# Patient Record
Sex: MALE | Race: WHITE | Employment: UNEMPLOYED | URBAN - METROPOLITAN AREA
[De-identification: names, ages, dates, MRNs, and addresses within clinical notes are randomized per-mention and may not be internally consistent; named-entity substitution may affect disease eponyms.]

---

## 2018-06-07 ENCOUNTER — APPOINTMENT (OUTPATIENT)
Dept: GENERAL RADIOLOGY | Age: 53
End: 2018-06-07
Payer: COMMERCIAL

## 2018-06-07 ENCOUNTER — HOSPITAL ENCOUNTER (EMERGENCY)
Age: 53
Discharge: HOME | End: 2018-06-07
Payer: COMMERCIAL

## 2018-06-07 VITALS
RESPIRATION RATE: 14 BRPM | HEART RATE: 86 BPM | OXYGEN SATURATION: 98 % | HEIGHT: 68 IN | BODY MASS INDEX: 40.92 KG/M2 | SYSTOLIC BLOOD PRESSURE: 140 MMHG | TEMPERATURE: 98.2 F | DIASTOLIC BLOOD PRESSURE: 86 MMHG | WEIGHT: 270 LBS

## 2018-06-07 DIAGNOSIS — S80.811A: Primary | ICD-10-CM

## 2018-06-07 DIAGNOSIS — S80.11XA CONTUSION OF RIGHT TIBIA: ICD-10-CM

## 2018-06-07 PROCEDURE — 6370000000 HC RX 637 (ALT 250 FOR IP): Performed by: NURSE PRACTITIONER

## 2018-06-07 PROCEDURE — 73590 X-RAY EXAM OF LOWER LEG: CPT

## 2018-06-07 PROCEDURE — 99282 EMERGENCY DEPT VISIT SF MDM: CPT

## 2018-06-07 RX ORDER — ACETAMINOPHEN 500 MG
1000 TABLET ORAL ONCE
Status: COMPLETED | OUTPATIENT
Start: 2018-06-07 | End: 2018-06-07

## 2018-06-07 RX ORDER — ACETAMINOPHEN 500 MG
500 TABLET ORAL EVERY 6 HOURS PRN
Qty: 20 TABLET | Refills: 0 | Status: SHIPPED | OUTPATIENT
Start: 2018-06-07 | End: 2020-04-14

## 2018-06-07 RX ADMIN — ACETAMINOPHEN 1000 MG: 500 TABLET ORAL at 18:21

## 2018-06-07 ASSESSMENT — PAIN SCALES - GENERAL
PAINLEVEL_OUTOF10: 10

## 2018-06-07 ASSESSMENT — PAIN DESCRIPTION - ORIENTATION
ORIENTATION: RIGHT;LOWER
ORIENTATION: RIGHT

## 2018-06-07 ASSESSMENT — PAIN DESCRIPTION - PAIN TYPE
TYPE: ACUTE PAIN
TYPE: ACUTE PAIN

## 2018-06-07 ASSESSMENT — PAIN DESCRIPTION - LOCATION
LOCATION: LEG
LOCATION: LEG

## 2018-06-07 ASSESSMENT — PAIN DESCRIPTION - DESCRIPTORS
DESCRIPTORS: ACHING;BURNING
DESCRIPTORS: BURNING

## 2018-06-07 ASSESSMENT — PAIN DESCRIPTION - FREQUENCY
FREQUENCY: CONTINUOUS
FREQUENCY: CONTINUOUS

## 2018-06-07 ASSESSMENT — PAIN DESCRIPTION - PROGRESSION: CLINICAL_PROGRESSION: NOT CHANGED

## 2018-06-21 ENCOUNTER — HOSPITAL ENCOUNTER (EMERGENCY)
Age: 53
Discharge: HOME | End: 2018-06-21
Payer: COMMERCIAL

## 2018-06-21 ENCOUNTER — APPOINTMENT (OUTPATIENT)
Dept: GENERAL RADIOLOGY | Age: 53
End: 2018-06-21
Payer: COMMERCIAL

## 2018-06-21 VITALS
OXYGEN SATURATION: 97 % | RESPIRATION RATE: 20 BRPM | TEMPERATURE: 98.2 F | SYSTOLIC BLOOD PRESSURE: 147 MMHG | HEART RATE: 74 BPM | DIASTOLIC BLOOD PRESSURE: 88 MMHG

## 2018-06-21 DIAGNOSIS — S69.92XA WRIST INJURY, LEFT, INITIAL ENCOUNTER: Primary | ICD-10-CM

## 2018-06-21 PROCEDURE — L3931 WHFO NONTORSION JOINT PREFAB: HCPCS

## 2018-06-21 PROCEDURE — 73110 X-RAY EXAM OF WRIST: CPT

## 2018-06-21 PROCEDURE — 99212 OFFICE O/P EST SF 10 MIN: CPT

## 2018-06-21 ASSESSMENT — PAIN SCALES - GENERAL: PAINLEVEL_OUTOF10: 9

## 2018-06-21 ASSESSMENT — PAIN DESCRIPTION - PROGRESSION: CLINICAL_PROGRESSION: NOT CHANGED

## 2018-06-21 ASSESSMENT — PAIN DESCRIPTION - DESCRIPTORS: DESCRIPTORS: ACHING;SHARP

## 2018-06-21 ASSESSMENT — PAIN DESCRIPTION - FREQUENCY: FREQUENCY: INTERMITTENT

## 2018-06-21 ASSESSMENT — PAIN DESCRIPTION - LOCATION: LOCATION: WRIST

## 2018-06-21 ASSESSMENT — PAIN DESCRIPTION - PAIN TYPE: TYPE: ACUTE PAIN

## 2018-06-21 ASSESSMENT — PAIN DESCRIPTION - ORIENTATION: ORIENTATION: LEFT

## 2018-06-21 ASSESSMENT — PAIN DESCRIPTION - ONSET: ONSET: SUDDEN

## 2019-03-08 ENCOUNTER — HOSPITAL ENCOUNTER (EMERGENCY)
Age: 54
Discharge: HOME | End: 2019-03-08
Payer: COMMERCIAL

## 2019-03-08 ENCOUNTER — APPOINTMENT (OUTPATIENT)
Dept: GENERAL RADIOLOGY | Age: 54
End: 2019-03-08
Payer: COMMERCIAL

## 2019-03-08 VITALS
DIASTOLIC BLOOD PRESSURE: 84 MMHG | HEART RATE: 78 BPM | RESPIRATION RATE: 16 BRPM | HEIGHT: 66 IN | TEMPERATURE: 98.7 F | OXYGEN SATURATION: 99 % | SYSTOLIC BLOOD PRESSURE: 134 MMHG | BODY MASS INDEX: 41.78 KG/M2 | WEIGHT: 260 LBS

## 2019-03-08 DIAGNOSIS — J10.1 INFLUENZA A: Primary | ICD-10-CM

## 2019-03-08 LAB
INFLUENZA A BY PCR: DETECTED
INFLUENZA B BY PCR: NOT DETECTED

## 2019-03-08 PROCEDURE — 99283 EMERGENCY DEPT VISIT LOW MDM: CPT

## 2019-03-08 PROCEDURE — 6370000000 HC RX 637 (ALT 250 FOR IP): Performed by: NURSE PRACTITIONER

## 2019-03-08 PROCEDURE — 87502 INFLUENZA DNA AMP PROBE: CPT

## 2019-03-08 PROCEDURE — 71046 X-RAY EXAM CHEST 2 VIEWS: CPT

## 2019-03-08 RX ORDER — PREDNISONE 10 MG/1
TABLET ORAL
Qty: 30 TABLET | Refills: 0 | Status: SHIPPED | OUTPATIENT
Start: 2019-03-08 | End: 2019-03-18

## 2019-03-08 RX ORDER — OSELTAMIVIR PHOSPHATE 75 MG/1
75 CAPSULE ORAL 2 TIMES DAILY
Qty: 10 CAPSULE | Refills: 0 | Status: SHIPPED | OUTPATIENT
Start: 2019-03-08 | End: 2019-03-13

## 2019-03-08 RX ORDER — PREDNISONE 20 MG/1
60 TABLET ORAL ONCE
Status: COMPLETED | OUTPATIENT
Start: 2019-03-08 | End: 2019-03-08

## 2019-03-08 RX ORDER — ALBUTEROL SULFATE 90 UG/1
2 AEROSOL, METERED RESPIRATORY (INHALATION) EVERY 4 HOURS PRN
Qty: 1 INHALER | Refills: 0 | Status: SHIPPED | OUTPATIENT
Start: 2019-03-08 | End: 2020-04-14

## 2019-03-08 RX ORDER — IPRATROPIUM BROMIDE AND ALBUTEROL SULFATE 2.5; .5 MG/3ML; MG/3ML
1 SOLUTION RESPIRATORY (INHALATION)
Status: DISCONTINUED | OUTPATIENT
Start: 2019-03-08 | End: 2019-03-08 | Stop reason: HOSPADM

## 2019-03-08 RX ADMIN — PREDNISONE 60 MG: 20 TABLET ORAL at 11:49

## 2019-03-08 RX ADMIN — IPRATROPIUM BROMIDE AND ALBUTEROL SULFATE 1 AMPULE: .5; 3 SOLUTION RESPIRATORY (INHALATION) at 11:49

## 2019-03-08 ASSESSMENT — PAIN DESCRIPTION - DESCRIPTORS: DESCRIPTORS: ACHING

## 2019-03-08 ASSESSMENT — PAIN SCALES - GENERAL: PAINLEVEL_OUTOF10: 10

## 2019-03-08 ASSESSMENT — PAIN DESCRIPTION - ONSET: ONSET: PROGRESSIVE

## 2019-03-08 ASSESSMENT — PAIN - FUNCTIONAL ASSESSMENT: PAIN_FUNCTIONAL_ASSESSMENT: PREVENTS OR INTERFERES SOME ACTIVE ACTIVITIES AND ADLS

## 2019-03-08 ASSESSMENT — PAIN DESCRIPTION - LOCATION: LOCATION: GENERALIZED

## 2019-03-08 ASSESSMENT — PAIN DESCRIPTION - PAIN TYPE: TYPE: ACUTE PAIN

## 2019-03-08 ASSESSMENT — PAIN DESCRIPTION - FREQUENCY: FREQUENCY: CONTINUOUS

## 2019-08-09 ENCOUNTER — HOSPITAL ENCOUNTER (OUTPATIENT)
Age: 54
Discharge: HOME | End: 2019-08-11
Payer: COMMERCIAL

## 2019-08-09 ENCOUNTER — HOSPITAL ENCOUNTER (OUTPATIENT)
Dept: GENERAL RADIOLOGY | Age: 54
Discharge: HOME | End: 2019-08-11
Payer: COMMERCIAL

## 2019-08-09 ENCOUNTER — HOSPITAL ENCOUNTER (OUTPATIENT)
Age: 54
Discharge: HOME | End: 2019-08-09
Payer: COMMERCIAL

## 2019-08-09 DIAGNOSIS — J41.0 SIMPLE CHRONIC BRONCHITIS (HCC): ICD-10-CM

## 2019-08-09 LAB
ALBUMIN SERPL-MCNC: 4 G/DL (ref 3.5–5.2)
ALP BLD-CCNC: 60 U/L (ref 40–129)
ALT SERPL-CCNC: 13 U/L (ref 0–40)
ANION GAP SERPL CALCULATED.3IONS-SCNC: 2 MMOL/L (ref 7–16)
AST SERPL-CCNC: 13 U/L (ref 0–39)
BASOPHILS ABSOLUTE: 0.07 E9/L (ref 0–0.2)
BASOPHILS RELATIVE PERCENT: 0.7 % (ref 0–2)
BILIRUB SERPL-MCNC: 0.4 MG/DL (ref 0–1.2)
BUN BLDV-MCNC: 16 MG/DL (ref 6–20)
CALCIUM SERPL-MCNC: 9.2 MG/DL (ref 8.6–10.2)
CHLORIDE BLD-SCNC: 101 MMOL/L (ref 98–107)
CHOLESTEROL, TOTAL: 114 MG/DL (ref 0–199)
CO2: 34 MMOL/L (ref 22–29)
CREAT SERPL-MCNC: 0.7 MG/DL (ref 0.7–1.2)
EOSINOPHILS ABSOLUTE: 0.22 E9/L (ref 0.05–0.5)
EOSINOPHILS RELATIVE PERCENT: 2.1 % (ref 0–6)
GFR AFRICAN AMERICAN: >60
GFR NON-AFRICAN AMERICAN: >60 ML/MIN/1.73
GLUCOSE BLD-MCNC: 255 MG/DL (ref 74–99)
HBA1C MFR BLD: 8.7 % (ref 4–5.6)
HCT VFR BLD CALC: 42.2 % (ref 37–54)
HDLC SERPL-MCNC: 30 MG/DL
HEMOGLOBIN: 14.8 G/DL (ref 12.5–16.5)
IMMATURE GRANULOCYTES #: 0.05 E9/L
IMMATURE GRANULOCYTES %: 0.5 % (ref 0–5)
LDL CHOLESTEROL CALCULATED: 57 MG/DL (ref 0–99)
LYMPHOCYTES ABSOLUTE: 1.56 E9/L (ref 1.5–4)
LYMPHOCYTES RELATIVE PERCENT: 15.2 % (ref 20–42)
MCH RBC QN AUTO: 33.6 PG (ref 26–35)
MCHC RBC AUTO-ENTMCNC: 35.1 % (ref 32–34.5)
MCV RBC AUTO: 95.7 FL (ref 80–99.9)
MONOCYTES ABSOLUTE: 0.76 E9/L (ref 0.1–0.95)
MONOCYTES RELATIVE PERCENT: 7.4 % (ref 2–12)
NEUTROPHILS ABSOLUTE: 7.62 E9/L (ref 1.8–7.3)
NEUTROPHILS RELATIVE PERCENT: 74.1 % (ref 43–80)
PDW BLD-RTO: 12.6 FL (ref 11.5–15)
PLATELET # BLD: 230 E9/L (ref 130–450)
PMV BLD AUTO: 10.1 FL (ref 7–12)
POTASSIUM SERPL-SCNC: 4.6 MMOL/L (ref 3.5–5)
PROSTATE SPECIFIC ANTIGEN: 0.88 NG/ML (ref 0–4)
RBC # BLD: 4.41 E12/L (ref 3.8–5.8)
SODIUM BLD-SCNC: 137 MMOL/L (ref 132–146)
T4 TOTAL: 7.9 MCG/DL (ref 4.5–11.7)
TOTAL PROTEIN: 7.8 G/DL (ref 6.4–8.3)
TRIGL SERPL-MCNC: 133 MG/DL (ref 0–149)
TSH SERPL DL<=0.05 MIU/L-ACNC: 2.97 UIU/ML (ref 0.27–4.2)
VLDLC SERPL CALC-MCNC: 27 MG/DL
WBC # BLD: 10.3 E9/L (ref 4.5–11.5)

## 2019-08-09 PROCEDURE — 80053 COMPREHEN METABOLIC PANEL: CPT

## 2019-08-09 PROCEDURE — 80061 LIPID PANEL: CPT

## 2019-08-09 PROCEDURE — G0103 PSA SCREENING: HCPCS

## 2019-08-09 PROCEDURE — 84436 ASSAY OF TOTAL THYROXINE: CPT

## 2019-08-09 PROCEDURE — 71046 X-RAY EXAM CHEST 2 VIEWS: CPT

## 2019-08-09 PROCEDURE — 83036 HEMOGLOBIN GLYCOSYLATED A1C: CPT

## 2019-08-09 PROCEDURE — 36415 COLL VENOUS BLD VENIPUNCTURE: CPT

## 2019-08-09 PROCEDURE — 85025 COMPLETE CBC W/AUTO DIFF WBC: CPT

## 2019-08-09 PROCEDURE — 84443 ASSAY THYROID STIM HORMONE: CPT

## 2020-04-14 ENCOUNTER — APPOINTMENT (OUTPATIENT)
Dept: CT IMAGING | Age: 55
End: 2020-04-14
Payer: COMMERCIAL

## 2020-04-14 ENCOUNTER — HOSPITAL ENCOUNTER (EMERGENCY)
Age: 55
Discharge: HOME | End: 2020-04-14
Attending: EMERGENCY MEDICINE
Payer: COMMERCIAL

## 2020-04-14 VITALS
BODY MASS INDEX: 41.46 KG/M2 | HEART RATE: 100 BPM | RESPIRATION RATE: 16 BRPM | WEIGHT: 258 LBS | OXYGEN SATURATION: 95 % | SYSTOLIC BLOOD PRESSURE: 168 MMHG | TEMPERATURE: 97.5 F | HEIGHT: 66 IN | DIASTOLIC BLOOD PRESSURE: 100 MMHG

## 2020-04-14 LAB
BILIRUBIN URINE: NEGATIVE
BLOOD, URINE: NEGATIVE
CLARITY: CLEAR
COLOR: YELLOW
GLUCOSE URINE: 500 MG/DL
KETONES, URINE: NEGATIVE MG/DL
LEUKOCYTE ESTERASE, URINE: NEGATIVE
NITRITE, URINE: NEGATIVE
PH UA: 5 (ref 5–9)
PROTEIN UA: NEGATIVE MG/DL
SPECIFIC GRAVITY UA: 1.02 (ref 1–1.03)
UROBILINOGEN, URINE: 0.2 E.U./DL

## 2020-04-14 PROCEDURE — 96372 THER/PROPH/DIAG INJ SC/IM: CPT

## 2020-04-14 PROCEDURE — 6360000002 HC RX W HCPCS: Performed by: EMERGENCY MEDICINE

## 2020-04-14 PROCEDURE — 74176 CT ABD & PELVIS W/O CONTRAST: CPT

## 2020-04-14 PROCEDURE — 81003 URINALYSIS AUTO W/O SCOPE: CPT

## 2020-04-14 PROCEDURE — 99284 EMERGENCY DEPT VISIT MOD MDM: CPT

## 2020-04-14 RX ORDER — CHLORZOXAZONE 500 MG/1
500 TABLET ORAL 3 TIMES DAILY PRN
Qty: 15 TABLET | Refills: 0 | Status: SHIPPED | OUTPATIENT
Start: 2020-04-14 | End: 2020-04-19

## 2020-04-14 RX ORDER — HYDROCODONE BITARTRATE AND ACETAMINOPHEN 5; 325 MG/1; MG/1
1 TABLET ORAL EVERY 6 HOURS PRN
Qty: 12 TABLET | Refills: 0 | Status: SHIPPED | OUTPATIENT
Start: 2020-04-14 | End: 2020-04-17

## 2020-04-14 RX ORDER — ORPHENADRINE CITRATE 30 MG/ML
60 INJECTION INTRAMUSCULAR; INTRAVENOUS ONCE
Status: COMPLETED | OUTPATIENT
Start: 2020-04-14 | End: 2020-04-14

## 2020-04-14 RX ORDER — MORPHINE SULFATE 4 MG/ML
4 INJECTION, SOLUTION INTRAMUSCULAR; INTRAVENOUS ONCE
Status: COMPLETED | OUTPATIENT
Start: 2020-04-14 | End: 2020-04-14

## 2020-04-14 RX ADMIN — ORPHENADRINE CITRATE 60 MG: 30 INJECTION INTRAMUSCULAR; INTRAVENOUS at 13:01

## 2020-04-14 RX ADMIN — MORPHINE SULFATE 4 MG: 4 INJECTION, SOLUTION INTRAMUSCULAR; INTRAVENOUS at 13:00

## 2020-04-14 ASSESSMENT — PAIN DESCRIPTION - PROGRESSION: CLINICAL_PROGRESSION: GRADUALLY WORSENING

## 2020-04-14 ASSESSMENT — PAIN DESCRIPTION - LOCATION: LOCATION: FLANK

## 2020-04-14 ASSESSMENT — PAIN DESCRIPTION - ORIENTATION: ORIENTATION: LEFT

## 2020-04-14 ASSESSMENT — PAIN DESCRIPTION - FREQUENCY: FREQUENCY: CONTINUOUS

## 2020-04-14 ASSESSMENT — PAIN DESCRIPTION - DESCRIPTORS: DESCRIPTORS: SHARP

## 2020-04-14 ASSESSMENT — PAIN DESCRIPTION - PAIN TYPE: TYPE: ACUTE PAIN

## 2020-04-14 ASSESSMENT — PAIN SCALES - GENERAL
PAINLEVEL_OUTOF10: 10
PAINLEVEL_OUTOF10: 10
PAINLEVEL_OUTOF10: 9

## 2020-04-14 ASSESSMENT — PAIN - FUNCTIONAL ASSESSMENT: PAIN_FUNCTIONAL_ASSESSMENT: PREVENTS OR INTERFERES SOME ACTIVE ACTIVITIES AND ADLS

## 2020-04-14 ASSESSMENT — PAIN DESCRIPTION - ONSET: ONSET: ON-GOING

## 2020-04-14 NOTE — ED PROVIDER NOTES
HPI:  4/14/20,   Time: 12:57 PM EDT         Ghanshyam Rios is a 47 y.o. male presenting to the ED for left posterior flank pain, beginning 1 hr ago. The complaint has been persistent, moderate in severity, and worsened by nothing. Patient denies history of kidney stones and denies injury or fall. ROS:   Pertinent positives and negatives are stated within HPI, all other systems reviewed and are negative.  --------------------------------------------- PAST HISTORY ---------------------------------------------  Past Medical History:  has a past medical history of Back problem, COPD (chronic obstructive pulmonary disease) ([facility] [address] Diabetes mellitus ([facility] [address] Hypertension, Hypertension, Nausea & vomiting, Neck problem, Sleep apnea, and Testicular mass. Past Surgical History:  has a past surgical history that includes fracture surgery (Left); cyst removal; ECHO Compl W Dop Color Flow (5/10/2013); and Testicle removal (Left, 7/12/2013). Social History:  reports that he has been smoking cigarettes. He has a 55.50 pack-year smoking history. He has never used smokeless tobacco. He reports current alcohol use. He reports current drug use. Frequency: 1.00 time per week. Drug: Marijuana. Family History: family history is not on file. The patients home medications have been reviewed.     Allergies: Aspirin; Ibuprofen; Aspirin; Ibuprofen; and Penicillins    -------------------------------------------------- RESULTS -------------------------------------------------  All laboratory and radiology results have been personally reviewed by myself   LABS:  Results for orders placed or performed during the hospital encounter of 04/14/20   Urinalysis   Result Value Ref Range    Color, UA Yellow Straw/Yellow    Clarity, UA Clear Clear    Glucose, Ur 500 (A) Negative mg/dL    Bilirubin Urine Negative Negative    Ketones, Urine Negative Negative mg/dL    Specific Gravity, UA 1.025 1.005 - 1.030    Blood, Urine Negative Negative pH, UA 5.0 5.0 - 9.0    Protein, UA Negative Negative mg/dL    Urobilinogen, Urine 0.2 <2.0 E.U./dL    Nitrite, Urine Negative Negative    Leukocyte Esterase, Urine Negative Negative       RADIOLOGY:  Interpreted by Radiologist.  [address]   Final Result         1. No acute abnormality seen in the abdomen or the pelvis. 2. No urolithiasis or hydronephrosis. 3. Small fat-containing inguinal hernias. No evidence of fat   strangulation. Postoperative changes from left orchiectomy. 4. Mild to moderate hepatic steatosis.                ------------------------- NURSING NOTES AND VITALS REVIEWED ---------------------------   The nursing notes within the ED encounter and vital signs as below have been reviewed. BP (!) 168/100   Pulse 100   Temp 97.5 °F (36.4 °C) (Temporal)   Resp 16   Ht 5' 6\" (1.676 m)   Wt 258 lb (117 kg)   SpO2 95%   BMI 41.64 kg/m²   Oxygen Saturation Interpretation:Normal      ---------------------------------------------------PHYSICAL EXAM--------------------------------------      Constitutional/General: Alert and oriented x3, well appearing, non toxic in NAD  Head: NC/AT  Eyes: PERRL, EOMI  Mouth: Oropharynx clear, handling secretions, no trismus  Neck: Supple, full ROM, no meningeal signs  Pulmonary: Lungs clear to auscultation bilaterally, no wheezes, rales, or rhonchi. Not in respiratory distress  Cardiovascular:  Regular rate and rhythm, no murmurs, gallops, or rubs. 2+ distal pulses  Abdomen: Soft, non tender, non distended,   Extremities: Moves all extremities x 4.  Warm and well perfused  Skin: warm and dry without rash  Neurologic: GCS 15,  Psych: Normal Affect      ------------------------------ ED COURSE/MEDICAL DECISION MAKING----------------------  Medications   morphine sulfate (PF) injection 4 mg (4 mg Intramuscular Given 4/14/20 1300)   orphenadrine (NORFLEX) injection 60 mg (60 mg Intramuscular Given 4/14/20 1301)         Medical Decision

## 2020-11-30 ENCOUNTER — TELEPHONE (OUTPATIENT)
Dept: SLEEP CENTER | Age: 55
End: 2020-11-30

## 2020-12-03 ENCOUNTER — TELEPHONE (OUTPATIENT)
Dept: SLEEP CENTER | Age: 55
End: 2020-12-03

## 2020-12-06 ENCOUNTER — APPOINTMENT (OUTPATIENT)
Dept: CT IMAGING | Age: 55
End: 2020-12-06
Payer: COMMERCIAL

## 2020-12-06 ENCOUNTER — HOSPITAL ENCOUNTER (EMERGENCY)
Age: 55
Discharge: HOME | End: 2020-12-06
Payer: COMMERCIAL

## 2020-12-06 VITALS
WEIGHT: 256 LBS | BODY MASS INDEX: 41.14 KG/M2 | HEART RATE: 69 BPM | OXYGEN SATURATION: 95 % | RESPIRATION RATE: 18 BRPM | DIASTOLIC BLOOD PRESSURE: 82 MMHG | SYSTOLIC BLOOD PRESSURE: 148 MMHG | HEIGHT: 66 IN | TEMPERATURE: 96.8 F

## 2020-12-06 LAB
ALBUMIN SERPL-MCNC: 4.1 G/DL (ref 3.5–5.2)
ALP BLD-CCNC: 57 U/L (ref 40–129)
ALT SERPL-CCNC: 17 U/L (ref 0–40)
ANION GAP SERPL CALCULATED.3IONS-SCNC: 10 MMOL/L (ref 7–16)
APTT: 28.5 SEC (ref 24.5–35.1)
AST SERPL-CCNC: 12 U/L (ref 0–39)
BASOPHILS ABSOLUTE: 0.06 E9/L (ref 0–0.2)
BASOPHILS RELATIVE PERCENT: 0.7 % (ref 0–2)
BILIRUB SERPL-MCNC: 0.4 MG/DL (ref 0–1.2)
BUN BLDV-MCNC: 18 MG/DL (ref 6–20)
CALCIUM SERPL-MCNC: 9 MG/DL (ref 8.6–10.2)
CHLORIDE BLD-SCNC: 102 MMOL/L (ref 98–107)
CO2: 25 MMOL/L (ref 22–29)
CREAT SERPL-MCNC: 0.7 MG/DL (ref 0.7–1.2)
EOSINOPHILS ABSOLUTE: 0.21 E9/L (ref 0.05–0.5)
EOSINOPHILS RELATIVE PERCENT: 2.3 % (ref 0–6)
GFR AFRICAN AMERICAN: >60
GFR NON-AFRICAN AMERICAN: >60 ML/MIN/1.73
GLUCOSE BLD-MCNC: 281 MG/DL (ref 74–99)
HCT VFR BLD CALC: 47.2 % (ref 37–54)
HEMOGLOBIN: 17 G/DL (ref 12.5–16.5)
IMMATURE GRANULOCYTES #: 0.02 E9/L
IMMATURE GRANULOCYTES %: 0.2 % (ref 0–5)
INR BLD: 1
LYMPHOCYTES ABSOLUTE: 2.07 E9/L (ref 1.5–4)
LYMPHOCYTES RELATIVE PERCENT: 23.1 % (ref 20–42)
MCH RBC QN AUTO: 33.5 PG (ref 26–35)
MCHC RBC AUTO-ENTMCNC: 36 % (ref 32–34.5)
MCV RBC AUTO: 92.9 FL (ref 80–99.9)
MONOCYTES ABSOLUTE: 0.59 E9/L (ref 0.1–0.95)
MONOCYTES RELATIVE PERCENT: 6.6 % (ref 2–12)
NEUTROPHILS ABSOLUTE: 6 E9/L (ref 1.8–7.3)
NEUTROPHILS RELATIVE PERCENT: 67.1 % (ref 43–80)
PDW BLD-RTO: 12.3 FL (ref 11.5–15)
PLATELET # BLD: 190 E9/L (ref 130–450)
PMV BLD AUTO: 10.7 FL (ref 7–12)
POTASSIUM REFLEX MAGNESIUM: 3.9 MMOL/L (ref 3.5–5)
PROTHROMBIN TIME: 10.8 SEC (ref 9.3–12.4)
RBC # BLD: 5.08 E12/L (ref 3.8–5.8)
SODIUM BLD-SCNC: 137 MMOL/L (ref 132–146)
TOTAL PROTEIN: 7.6 G/DL (ref 6.4–8.3)
TROPONIN: <0.01 NG/ML (ref 0–0.03)
WBC # BLD: 9 E9/L (ref 4.5–11.5)

## 2020-12-06 PROCEDURE — 6370000000 HC RX 637 (ALT 250 FOR IP): Performed by: NURSE PRACTITIONER

## 2020-12-06 PROCEDURE — 84484 ASSAY OF TROPONIN QUANT: CPT

## 2020-12-06 PROCEDURE — 85730 THROMBOPLASTIN TIME PARTIAL: CPT

## 2020-12-06 PROCEDURE — 70450 CT HEAD/BRAIN W/O DYE: CPT

## 2020-12-06 PROCEDURE — 36415 COLL VENOUS BLD VENIPUNCTURE: CPT

## 2020-12-06 PROCEDURE — 85610 PROTHROMBIN TIME: CPT

## 2020-12-06 PROCEDURE — 80053 COMPREHEN METABOLIC PANEL: CPT

## 2020-12-06 PROCEDURE — 93005 ELECTROCARDIOGRAM TRACING: CPT | Performed by: NURSE PRACTITIONER

## 2020-12-06 PROCEDURE — 99284 EMERGENCY DEPT VISIT MOD MDM: CPT

## 2020-12-06 PROCEDURE — 85025 COMPLETE CBC W/AUTO DIFF WBC: CPT

## 2020-12-06 RX ORDER — ACETAMINOPHEN 500 MG
1000 TABLET ORAL ONCE
Status: COMPLETED | OUTPATIENT
Start: 2020-12-06 | End: 2020-12-06

## 2020-12-06 RX ADMIN — ACETAMINOPHEN 1000 MG: 500 TABLET ORAL at 17:14

## 2020-12-06 ASSESSMENT — PAIN DESCRIPTION - PAIN TYPE: TYPE: ACUTE PAIN

## 2020-12-06 ASSESSMENT — PAIN DESCRIPTION - ONSET: ONSET: ON-GOING

## 2020-12-06 ASSESSMENT — PAIN DESCRIPTION - PROGRESSION
CLINICAL_PROGRESSION: NOT CHANGED
CLINICAL_PROGRESSION: NOT CHANGED

## 2020-12-06 ASSESSMENT — PAIN SCALES - GENERAL
PAINLEVEL_OUTOF10: 10
PAINLEVEL_OUTOF10: 10

## 2020-12-06 ASSESSMENT — PAIN DESCRIPTION - FREQUENCY
FREQUENCY: CONTINUOUS
FREQUENCY: CONTINUOUS

## 2020-12-06 ASSESSMENT — PAIN DESCRIPTION - LOCATION
LOCATION: HEAD
LOCATION: HEAD

## 2020-12-06 ASSESSMENT — PAIN DESCRIPTION - DESCRIPTORS: DESCRIPTORS: SQUEEZING;CONSTANT

## 2020-12-06 ASSESSMENT — PAIN DESCRIPTION - ORIENTATION: ORIENTATION: ANTERIOR

## 2020-12-06 ASSESSMENT — PAIN - FUNCTIONAL ASSESSMENT: PAIN_FUNCTIONAL_ASSESSMENT: PREVENTS OR INTERFERES SOME ACTIVE ACTIVITIES AND ADLS

## 2020-12-06 NOTE — ED PROVIDER NOTES
[facility]  Department of Emergency Medicine   ED  Encounter Note  Admit Date/RoomTime: 2020  4:43 PM  ED Room:     NAME: Joe Hope  : 1965  MRN: [id number]     Chief Complaint:  Headache (patient states that this is the worse HA he has ever had (patient's BP is 176/106 here))    History of Present Illness      Joe Hope is a 54 y.o. old male who presents to the emergency department by private vehicle, for complaint of sudden onset frontal aching pain which began several day(s) prior to arrival.  Since onset the symptoms have been intermittent and mild-moderate in severity. The patient has history of headaches of unknown type diagnosed in the past.   Today's episode is not typical for him. He has had PCP evaluation in the past. The pain is associated with photophobia and negative for numbness, weakness, speech or visual changes. The symptoms are aggravated by movement and relieved by nothing. There has been no history of recent traum. Treatment prior to arrival consisted of: unable to obtain relief with OTC meds with minimal relief of symptoms. He takes no blood thinning agents. ROS   Pertinent positives and negatives are stated within HPI, all other systems reviewed and are negative. Past Medical History:  has a past medical history of Back problem, COPD (chronic obstructive pulmonary disease) ([address] Diabetes mellitus ([address] Hypertension, Hypertension, Nausea & vomiting, Neck problem, Sleep apnea, and Testicular mass. Surgical History:  has a past surgical history that includes fracture surgery (Left); cyst removal; ECHO Compl W Dop Color Flow (5/10/2013); and Testicle removal (Left, 2013). Social History:  reports that he has been smoking cigarettes. He has a 55.50 pack-year smoking history. He has never used smokeless tobacco. He reports current alcohol use. He reports current drug use. Frequency: 1.00 time per week.  Drug: Marijuana. Family History: family history is not on file. Allergies: Aspirin; Ibuprofen; Aspirin; Ibuprofen; and Penicillins    Physical Exam   Oxygen Saturation Interpretation: Normal.        ED Triage Vitals   BP Temp Temp Source Pulse Resp SpO2 Height Weight   12/06/20 1648 12/06/20 1648 12/06/20 1648 12/06/20 1648 12/06/20 1648 12/06/20 1648 12/06/20 1654 12/06/20 1654   (!) 176/106 96.8 °F (36 °C) Temporal 94 16 95 % 5' 6\" (1.676 m) 256 lb (116.1 kg)         Constitutional:  Alert, development consistent with age. HEENT:  NC/NT. PERRLA. Airway patent. Neck:  Normal ROM. Supple. No meningeal signs  Respiratory:  Clear to auscultation and breath sounds equal.  CV:  Regular rate and rhythm, normal heart sounds, without pathological murmurs, ectopy, gallops, or rubs. GI:  Abdomen Soft, nontender, good bowel sounds. No firm or pulsatile mass. Back:  No costovertebral tenderness. Extremities: No tenderness or edema noted. Integument:  Normal turgor. Warm, dry, without visible rash, unless noted elsewhere. Lymphatics: No lymphangitis or adenopathy noted. Neurological:  Oriented x 3, GCS 15. CNII-XII grossly intact. Motor functions intact.      Lab / Imaging Results   (All laboratory and radiology results have been personally reviewed by myself)  Labs:  Results for orders placed or performed during the hospital encounter of 12/06/20   CBC Auto Differential   Result Value Ref Range    WBC 9.0 4.5 - 11.5 E9/L    RBC 5.08 3.80 - 5.80 E12/L    Hemoglobin 17.0 (H) 12.5 - 16.5 g/dL    Hematocrit 47.2 37.0 - 54.0 %    MCV 92.9 80.0 - 99.9 fL    MCH 33.5 26.0 - 35.0 pg    MCHC 36.0 (H) 32.0 - 34.5 %    RDW 12.3 11.5 - 15.0 fL    Platelets 910 890 - 365 E9/L    MPV 10.7 7.0 - 12.0 fL    Neutrophils % 67.1 43.0 - 80.0 %    Immature Granulocytes % 0.2 0.0 - 5.0 %    Lymphocytes % 23.1 20.0 - 42.0 %    Monocytes % 6.6 2.0 - 12.0 %    Eosinophils % 2.3 0.0 - 6.0 %    Basophils % 0.7 0.0 - 2.0 %    Neutrophils Absolute 6.00 1.80 - 7.30 E9/L    Immature Granulocytes # 0.02 E9/L    Lymphocytes Absolute 2.07 1.50 - 4.00 E9/L    Monocytes Absolute 0.59 0.10 - 0.95 E9/L    Eosinophils Absolute 0.21 0.05 - 0.50 E9/L    Basophils Absolute 0.06 0.00 - 0.20 E9/L   Comprehensive Metabolic Panel w/ Reflex to MG   Result Value Ref Range    Sodium 137 132 - 146 mmol/L    Potassium reflex Magnesium 3.9 3.5 - 5.0 mmol/L    Chloride 102 98 - 107 mmol/L    CO2 25 22 - 29 mmol/L    Anion Gap 10 7 - 16 mmol/L    Glucose 281 (H) 74 - 99 mg/dL    BUN 18 6 - 20 mg/dL    CREATININE 0.7 0.7 - 1.2 mg/dL    GFR Non-African American >60 >=60 mL/min/1.73    GFR African American >60     Calcium 9.0 8.6 - 10.2 mg/dL    Total Protein 7.6 6.4 - 8.3 g/dL    Alb 4.1 3.5 - 5.2 g/dL    Total Bilirubin 0.4 0.0 - 1.2 mg/dL    Alkaline Phosphatase 57 40 - 129 U/L    ALT 17 0 - 40 U/L    AST 12 0 - 39 U/L   Troponin   Result Value Ref Range    Troponin <0.01 0.00 - 0.03 ng/mL   Protime-INR   Result Value Ref Range    Protime 10.8 9.3 - 12.4 sec    INR 1.0    APTT   Result Value Ref Range    aPTT 28.5 24.5 - 35.1 sec   EKG 12 Lead   Result Value Ref Range    Ventricular Rate 78 BPM    Atrial Rate 78 BPM    P-R Interval 184 ms    QRS Duration 82 ms    Q-T Interval 400 ms    QTc Calculation (Bazett) 456 ms    P Axis 51 degrees    R Axis 43 degrees    T Axis 45 degrees     Imaging: All Radiology results interpreted by Radiologist unless otherwise noted. CT Head WO Contrast   Final Result   No acute intracranial abnormality. ED Course / Medical Decision Making     Medications   acetaminophen (TYLENOL) tablet 1,000 mg (1,000 mg Oral Given 12/6/20 1714)        Re-examination:  12/6/20       Time: 1800  Patients condition is improving. he is resting comfortably sleeping in his room.   Consult(s):   None    Procedure(s):   none    MDM:   At this time the patient is without objective evidence of an acute process requiring hospitalization or inpatient

## 2020-12-07 ENCOUNTER — HOSPITAL ENCOUNTER (EMERGENCY)
Age: 55
Discharge: LEFT BEFORE DISCHARGE | End: 2020-12-07
Payer: COMMERCIAL

## 2020-12-07 LAB
EKG ATRIAL RATE: 78 BPM
EKG P AXIS: 51 DEGREES
EKG P-R INTERVAL: 184 MS
EKG Q-T INTERVAL: 400 MS
EKG QRS DURATION: 82 MS
EKG QTC CALCULATION (BAZETT): 456 MS
EKG R AXIS: 43 DEGREES
EKG T AXIS: 45 DEGREES
EKG VENTRICULAR RATE: 78 BPM

## 2020-12-07 PROCEDURE — 93010 ELECTROCARDIOGRAM REPORT: CPT | Performed by: INTERNAL MEDICINE

## 2020-12-07 NOTE — ED NOTES
Called patient again no answer.       Bobby [facility]anEn[facility]  12/07/20 032

## 2020-12-10 ENCOUNTER — APPOINTMENT (OUTPATIENT)
Dept: MRI IMAGING | Age: 55
End: 2020-12-10
Payer: COMMERCIAL

## 2020-12-10 ENCOUNTER — HOSPITAL ENCOUNTER (OUTPATIENT)
Age: 55
Setting detail: OBSERVATION
Discharge: HOME | End: 2020-12-13
Attending: EMERGENCY MEDICINE | Admitting: INTERNAL MEDICINE
Payer: COMMERCIAL

## 2020-12-10 ENCOUNTER — APPOINTMENT (OUTPATIENT)
Dept: CT IMAGING | Age: 55
End: 2020-12-10
Payer: COMMERCIAL

## 2020-12-10 ENCOUNTER — HOSPITAL ENCOUNTER (OUTPATIENT)
Dept: SLEEP CENTER | Age: 55
Discharge: HOME | End: 2020-12-10
Payer: COMMERCIAL

## 2020-12-10 PROBLEM — R29.810 FACIAL DROOP: Status: ACTIVE | Noted: 2020-12-10

## 2020-12-10 PROBLEM — I63.9 CVA (CEREBRAL VASCULAR ACCIDENT) (HCC): Status: ACTIVE | Noted: 2020-12-10

## 2020-12-10 LAB
ANION GAP SERPL CALCULATED.3IONS-SCNC: 10 MMOL/L (ref 7–16)
APTT: 24.1 SEC (ref 24.5–35.1)
BASOPHILS ABSOLUTE: 0.07 E9/L (ref 0–0.2)
BASOPHILS RELATIVE PERCENT: 0.7 % (ref 0–2)
BUN BLDV-MCNC: 13 MG/DL (ref 6–20)
CALCIUM SERPL-MCNC: 8.9 MG/DL (ref 8.6–10.2)
CHLORIDE BLD-SCNC: 102 MMOL/L (ref 98–107)
CO2: 23 MMOL/L (ref 22–29)
CREAT SERPL-MCNC: 0.7 MG/DL (ref 0.7–1.2)
EOSINOPHILS ABSOLUTE: 0.2 E9/L (ref 0.05–0.5)
EOSINOPHILS RELATIVE PERCENT: 2 % (ref 0–6)
GFR AFRICAN AMERICAN: >60
GFR NON-AFRICAN AMERICAN: >60 ML/MIN/1.73
GLUCOSE BLD-MCNC: 205 MG/DL (ref 74–99)
HCT VFR BLD CALC: 46.9 % (ref 37–54)
HEMOGLOBIN: 16.4 G/DL (ref 12.5–16.5)
IMMATURE GRANULOCYTES #: 0.02 E9/L
IMMATURE GRANULOCYTES %: 0.2 % (ref 0–5)
INR BLD: 1
LV EF: 70 %
LVEF MODALITY: NORMAL
LYMPHOCYTES ABSOLUTE: 1.81 E9/L (ref 1.5–4)
LYMPHOCYTES RELATIVE PERCENT: 18.2 % (ref 20–42)
MCH RBC QN AUTO: 33 PG (ref 26–35)
MCHC RBC AUTO-ENTMCNC: 35 % (ref 32–34.5)
MCV RBC AUTO: 94.4 FL (ref 80–99.9)
METER GLUCOSE: 157 MG/DL (ref 74–99)
METER GLUCOSE: 160 MG/DL (ref 74–99)
MONOCYTES ABSOLUTE: 0.55 E9/L (ref 0.1–0.95)
MONOCYTES RELATIVE PERCENT: 5.5 % (ref 2–12)
NEUTROPHILS ABSOLUTE: 7.3 E9/L (ref 1.8–7.3)
NEUTROPHILS RELATIVE PERCENT: 73.4 % (ref 43–80)
PDW BLD-RTO: 12.5 FL (ref 11.5–15)
PLATELET # BLD: 203 E9/L (ref 130–450)
PMV BLD AUTO: 11.1 FL (ref 7–12)
POTASSIUM SERPL-SCNC: 4.6 MMOL/L (ref 3.5–5)
PROTHROMBIN TIME: 11.8 SEC (ref 9.3–12.4)
RBC # BLD: 4.97 E12/L (ref 3.8–5.8)
REASON FOR REJECTION: NORMAL
REJECTED TEST: NORMAL
SODIUM BLD-SCNC: 135 MMOL/L (ref 132–146)
TROPONIN: <0.01 NG/ML (ref 0–0.03)
TROPONIN: <0.01 NG/ML (ref 0–0.03)
WBC # BLD: 10 E9/L (ref 4.5–11.5)

## 2020-12-10 PROCEDURE — 70496 CT ANGIOGRAPHY HEAD: CPT

## 2020-12-10 PROCEDURE — 85610 PROTHROMBIN TIME: CPT

## 2020-12-10 PROCEDURE — 6370000000 HC RX 637 (ALT 250 FOR IP): Performed by: NURSE PRACTITIONER

## 2020-12-10 PROCEDURE — 70551 MRI BRAIN STEM W/O DYE: CPT

## 2020-12-10 PROCEDURE — G0378 HOSPITAL OBSERVATION PER HR: HCPCS

## 2020-12-10 PROCEDURE — 86787 VARICELLA-ZOSTER ANTIBODY: CPT

## 2020-12-10 PROCEDURE — 6360000002 HC RX W HCPCS: Performed by: PHYSICIAN ASSISTANT

## 2020-12-10 PROCEDURE — 85025 COMPLETE CBC W/AUTO DIFF WBC: CPT

## 2020-12-10 PROCEDURE — 6360000004 HC RX CONTRAST MEDICATION: Performed by: RADIOLOGY

## 2020-12-10 PROCEDURE — C9113 INJ PANTOPRAZOLE SODIUM, VIA: HCPCS | Performed by: NURSE PRACTITIONER

## 2020-12-10 PROCEDURE — 70450 CT HEAD/BRAIN W/O DYE: CPT

## 2020-12-10 PROCEDURE — 96374 THER/PROPH/DIAG INJ IV PUSH: CPT

## 2020-12-10 PROCEDURE — 97535 SELF CARE MNGMENT TRAINING: CPT

## 2020-12-10 PROCEDURE — 70498 CT ANGIOGRAPHY NECK: CPT

## 2020-12-10 PROCEDURE — 97165 OT EVAL LOW COMPLEX 30 MIN: CPT

## 2020-12-10 PROCEDURE — 93308 TTE F-UP OR LMTD: CPT

## 2020-12-10 PROCEDURE — 99283 EMERGENCY DEPT VISIT LOW MDM: CPT

## 2020-12-10 PROCEDURE — 84484 ASSAY OF TROPONIN QUANT: CPT

## 2020-12-10 PROCEDURE — 82962 GLUCOSE BLOOD TEST: CPT

## 2020-12-10 PROCEDURE — 80048 BASIC METABOLIC PNL TOTAL CA: CPT

## 2020-12-10 PROCEDURE — 96375 TX/PRO/DX INJ NEW DRUG ADDON: CPT

## 2020-12-10 PROCEDURE — 85730 THROMBOPLASTIN TIME PARTIAL: CPT

## 2020-12-10 PROCEDURE — 6360000002 HC RX W HCPCS: Performed by: NURSE PRACTITIONER

## 2020-12-10 PROCEDURE — 36415 COLL VENOUS BLD VENIPUNCTURE: CPT

## 2020-12-10 PROCEDURE — 6360000004 HC RX CONTRAST MEDICATION: Performed by: NURSE PRACTITIONER

## 2020-12-10 RX ORDER — ACETAMINOPHEN 650 MG/1
650 SUPPOSITORY RECTAL EVERY 6 HOURS PRN
Status: DISCONTINUED | OUTPATIENT
Start: 2020-12-10 | End: 2020-12-13 | Stop reason: HOSPADM

## 2020-12-10 RX ORDER — POTASSIUM CHLORIDE 20 MEQ/1
40 TABLET, EXTENDED RELEASE ORAL PRN
Status: DISCONTINUED | OUTPATIENT
Start: 2020-12-10 | End: 2020-12-12

## 2020-12-10 RX ORDER — DIPHENHYDRAMINE HYDROCHLORIDE 50 MG/ML
25 INJECTION INTRAMUSCULAR; INTRAVENOUS ONCE
Status: COMPLETED | OUTPATIENT
Start: 2020-12-10 | End: 2020-12-10

## 2020-12-10 RX ORDER — LEVOTHYROXINE SODIUM 0.05 MG/1
50 TABLET ORAL DAILY
COMMUNITY

## 2020-12-10 RX ORDER — POTASSIUM CHLORIDE 7.45 MG/ML
10 INJECTION INTRAVENOUS PRN
Status: DISCONTINUED | OUTPATIENT
Start: 2020-12-10 | End: 2020-12-12

## 2020-12-10 RX ORDER — DEXTROSE MONOHYDRATE 25 G/50ML
12.5 INJECTION, SOLUTION INTRAVENOUS PRN
Status: DISCONTINUED | OUTPATIENT
Start: 2020-12-10 | End: 2020-12-13 | Stop reason: HOSPADM

## 2020-12-10 RX ORDER — METOCLOPRAMIDE HYDROCHLORIDE 5 MG/ML
10 INJECTION INTRAMUSCULAR; INTRAVENOUS ONCE
Status: COMPLETED | OUTPATIENT
Start: 2020-12-10 | End: 2020-12-10

## 2020-12-10 RX ORDER — CLOPIDOGREL BISULFATE 75 MG/1
75 TABLET ORAL DAILY
Status: DISCONTINUED | OUTPATIENT
Start: 2020-12-10 | End: 2020-12-13 | Stop reason: HOSPADM

## 2020-12-10 RX ORDER — LEVOTHYROXINE SODIUM 0.05 MG/1
50 TABLET ORAL DAILY
Status: DISCONTINUED | OUTPATIENT
Start: 2020-12-11 | End: 2020-12-13 | Stop reason: HOSPADM

## 2020-12-10 RX ORDER — MAGNESIUM SULFATE 1 G/100ML
1 INJECTION INTRAVENOUS PRN
Status: DISCONTINUED | OUTPATIENT
Start: 2020-12-10 | End: 2020-12-12

## 2020-12-10 RX ORDER — ATORVASTATIN CALCIUM 20 MG/1
40 TABLET, FILM COATED ORAL NIGHTLY
Status: DISCONTINUED | OUTPATIENT
Start: 2020-12-10 | End: 2020-12-13 | Stop reason: HOSPADM

## 2020-12-10 RX ORDER — LOSARTAN POTASSIUM 25 MG/1
25 TABLET ORAL DAILY
Status: DISCONTINUED | OUTPATIENT
Start: 2020-12-10 | End: 2020-12-13 | Stop reason: HOSPADM

## 2020-12-10 RX ORDER — PANTOPRAZOLE SODIUM 40 MG/10ML
40 INJECTION, POWDER, LYOPHILIZED, FOR SOLUTION INTRAVENOUS DAILY
Status: DISCONTINUED | OUTPATIENT
Start: 2020-12-10 | End: 2020-12-13 | Stop reason: HOSPADM

## 2020-12-10 RX ORDER — ATORVASTATIN CALCIUM 40 MG/1
40 TABLET, FILM COATED ORAL NIGHTLY
COMMUNITY

## 2020-12-10 RX ORDER — ONDANSETRON 2 MG/ML
4 INJECTION INTRAMUSCULAR; INTRAVENOUS ONCE
Status: COMPLETED | OUTPATIENT
Start: 2020-12-10 | End: 2020-12-10

## 2020-12-10 RX ORDER — MORPHINE SULFATE 4 MG/ML
4 INJECTION, SOLUTION INTRAMUSCULAR; INTRAVENOUS ONCE
Status: COMPLETED | OUTPATIENT
Start: 2020-12-10 | End: 2020-12-10

## 2020-12-10 RX ORDER — ACETAMINOPHEN 325 MG/1
650 TABLET ORAL EVERY 6 HOURS PRN
Status: DISCONTINUED | OUTPATIENT
Start: 2020-12-10 | End: 2020-12-13 | Stop reason: HOSPADM

## 2020-12-10 RX ORDER — SODIUM CHLORIDE 0.9 % (FLUSH) 0.9 %
10 SYRINGE (ML) INJECTION EVERY 12 HOURS SCHEDULED
Status: DISCONTINUED | OUTPATIENT
Start: 2020-12-10 | End: 2020-12-12

## 2020-12-10 RX ORDER — ONDANSETRON 2 MG/ML
4 INJECTION INTRAMUSCULAR; INTRAVENOUS EVERY 6 HOURS PRN
Status: DISCONTINUED | OUTPATIENT
Start: 2020-12-10 | End: 2020-12-13 | Stop reason: HOSPADM

## 2020-12-10 RX ORDER — LOSARTAN POTASSIUM 25 MG/1
25 TABLET ORAL DAILY
COMMUNITY

## 2020-12-10 RX ORDER — NICOTINE POLACRILEX 4 MG
15 LOZENGE BUCCAL PRN
Status: DISCONTINUED | OUTPATIENT
Start: 2020-12-10 | End: 2020-12-13 | Stop reason: HOSPADM

## 2020-12-10 RX ORDER — GLYBURIDE 5 MG/1
10 TABLET ORAL EVERY 12 HOURS
Status: DISCONTINUED | OUTPATIENT
Start: 2020-12-10 | End: 2020-12-13 | Stop reason: HOSPADM

## 2020-12-10 RX ORDER — PROMETHAZINE HYDROCHLORIDE 25 MG/1
12.5 TABLET ORAL EVERY 6 HOURS PRN
Status: DISCONTINUED | OUTPATIENT
Start: 2020-12-10 | End: 2020-12-13 | Stop reason: HOSPADM

## 2020-12-10 RX ORDER — PENTOXIFYLLINE 400 MG/1
400 TABLET, EXTENDED RELEASE ORAL
COMMUNITY

## 2020-12-10 RX ORDER — SODIUM CHLORIDE 0.9 % (FLUSH) 0.9 %
10 SYRINGE (ML) INJECTION PRN
Status: DISCONTINUED | OUTPATIENT
Start: 2020-12-10 | End: 2020-12-13 | Stop reason: HOSPADM

## 2020-12-10 RX ORDER — PENTOXIFYLLINE 400 MG/1
400 TABLET, EXTENDED RELEASE ORAL
Status: DISCONTINUED | OUTPATIENT
Start: 2020-12-10 | End: 2020-12-13 | Stop reason: HOSPADM

## 2020-12-10 RX ORDER — DEXTROSE MONOHYDRATE 50 MG/ML
100 INJECTION, SOLUTION INTRAVENOUS PRN
Status: DISCONTINUED | OUTPATIENT
Start: 2020-12-10 | End: 2020-12-13 | Stop reason: HOSPADM

## 2020-12-10 RX ADMIN — IOPAMIDOL 100 ML: 755 INJECTION, SOLUTION INTRAVENOUS at 11:41

## 2020-12-10 RX ADMIN — DIPHENHYDRAMINE HYDROCHLORIDE 25 MG: 50 INJECTION INTRAMUSCULAR; INTRAVENOUS at 13:10

## 2020-12-10 RX ADMIN — PANTOPRAZOLE SODIUM 40 MG: 40 INJECTION, POWDER, FOR SOLUTION INTRAVENOUS at 15:50

## 2020-12-10 RX ADMIN — CLOPIDOGREL BISULFATE 75 MG: 75 TABLET ORAL at 15:50

## 2020-12-10 RX ADMIN — ACETAMINOPHEN 650 MG: 325 TABLET, FILM COATED ORAL at 15:50

## 2020-12-10 RX ADMIN — ATORVASTATIN CALCIUM 40 MG: 20 TABLET, FILM COATED ORAL at 20:26

## 2020-12-10 RX ADMIN — MORPHINE SULFATE 4 MG: 4 INJECTION, SOLUTION INTRAMUSCULAR; INTRAVENOUS at 11:48

## 2020-12-10 RX ADMIN — PENTOXIFYLLINE 400 MG: 400 TABLET, EXTENDED RELEASE ORAL at 19:26

## 2020-12-10 RX ADMIN — ONDANSETRON 4 MG: 2 INJECTION INTRAMUSCULAR; INTRAVENOUS at 11:48

## 2020-12-10 RX ADMIN — PERFLUTREN 1.65 MG: 6.52 INJECTION, SUSPENSION INTRAVENOUS at 14:35

## 2020-12-10 RX ADMIN — METOCLOPRAMIDE HYDROCHLORIDE 10 MG: 5 INJECTION INTRAMUSCULAR; INTRAVENOUS at 13:10

## 2020-12-10 ASSESSMENT — PAIN SCALES - GENERAL
PAINLEVEL_OUTOF10: 10

## 2020-12-10 ASSESSMENT — PAIN DESCRIPTION - LOCATION
LOCATION: HEAD
LOCATION: EYE;HEAD

## 2020-12-10 ASSESSMENT — PAIN DESCRIPTION - ORIENTATION: ORIENTATION: LEFT

## 2020-12-10 ASSESSMENT — PAIN DESCRIPTION - DESCRIPTORS: DESCRIPTORS: ACHING;HEADACHE

## 2020-12-10 ASSESSMENT — PAIN DESCRIPTION - PAIN TYPE
TYPE: ACUTE PAIN
TYPE: ACUTE PAIN

## 2020-12-10 ASSESSMENT — PAIN DESCRIPTION - FREQUENCY: FREQUENCY: CONTINUOUS

## 2020-12-10 NOTE — PROGRESS NOTES
Speech Language Pathology      NAME:  Katelynn Moncada  :  1965  DATE: 12/10/2020  ROOM:  7541/2344-28    Attempted eval patient reports his headache is too bad kept eyes closed with hand on his head. Denies any difficulty swallowing. Will complete speech and swallow eval tomorrow.      Facial droop [R29.810]        Benjamin Love MSCCC/SLP  Speech Language Pathologist  [id number]

## 2020-12-10 NOTE — H&P
syncope. No shortness of breath, cough or congestion, hemoptysis. No abdominal pain, vomiting, bowel pattern changes, hematochezia or melena. No urinary or genital complaints. Missed left-sided headache which is atraumatic. Describes it as a pressure. It is painful even to touch the side of his head. He denies any claudication of the jaw or tongue. Denies amaurosis fugax. PAST MEDICAL Hx:  Past Medical History:   Diagnosis Date    Back problem     slipped discs in neck and back-lower    COPD [facility]ch[facility] obstructive pulmonary disease) ([facility])     uses bipap at night    Diabetes mellitus ([facility] [address]     STABLE PER PT STATES HE DOESNT CHECK    Hypertension     STABLE PER PT    Hypertension 5-10-13 lexiscan stress test    Nausea & vomiting     denies    Neck problem     Sleep apnea     to begin use of cpap; to bring DOS    Testicular mass        PAST SURGICAL Hx:   Past Surgical History:   Procedure Laterality Date    CYST REMOVAL      tailbone    ECHO COMPL W DOP COLOR FLOW  5/10/2013         FRACTURE SURGERY Left     arm    TESTICLE REMOVAL Left 7/12/2013    radical orchiectomy       FAMILY Hx:  History reviewed. No pertinent family history. HOME MEDICATIONS:  Prior to Admission medications    Medication Sig Start Date End Date Taking?  Authorizing Provider   metoprolol tartrate (LOPRESSOR) 25 MG tablet Take 25 mg by mouth 2 times daily   Yes Historical Provider, MD   atorvastatin (LIPITOR) 40 MG tablet Take 40 mg by mouth nightly   Yes Historical Provider, MD   levothyroxine (SYNTHROID) 50 MCG tablet Take 50 mcg by mouth Daily   Yes Historical Provider, MD   losartan (COZAAR) 25 MG tablet Take 25 mg by mouth daily   Yes Historical Provider, MD   pentoxifylline (TRENTAL) 400 MG extended release tablet Take 400 mg by mouth 3 times daily (with meals)   Yes Historical Provider, MD   glyBURIDE (DIABETA) 5 MG tablet Take 10 mg by mouth every 12 hours    Yes Historical Provider, MD cooperative, no apparent distress, and appears stated age    EYES:    Bilateral temporal arteries or nonnodular with brisk pulses. Mild tenderness elicited diffusely on light touch of the left face. The pupils are asymmetrical.  The left pupil is dilated as consequence of dilated eye exam.  The right pupil is round, reactive and symmetrical at 3 mm. Extraocular movement of the right eye is intact. The left eye reveals failure of an cyclotorsion and adduction, as well as ptosis. Sclera clear without icterus, conjunctiva normal    ENT:    Normocephalic, atraumatic, sinuses nontender on palpation. External ears without lesions. Oral pharynx with moist mucus membranes. Tonsils without erythema or exudates. NECK:    Supple, symmetrical, trachea midline, no adenopathy, thyroid symmetric, not enlarged and no tenderness, skin normal, no bruits, no JVD    HEMATOLOGIC/LYMPHATICS:    No cervical lymphadenopathy and no supraclavicular lymphadenopathy    LUNGS:    Symmetric. No increased work of breathing, diminished bibasilar air exchange, clear to auscultation bilaterally, no wheezes, rhonchi, or rales,     CARDIOVASCULAR:    Normal apical impulse, regular rate and rhythm, normal S1 and S2, no S3 or S4, and systolic murmur noted    ABDOMEN:    Obese contour, normal bowel sounds, soft, non-distended, non-tender, no masses palpated, no hepatosplenomegaly, no rebound or guarding elicited on palpation     MUSCULOSKELETAL:    There is no redness, warmth, or swelling of the joints. Full range of motion noted. Motor strength is 5 out of 5 all extremities bilaterally. Tone is normal.    NEUROLOGIC:    Awake, alert, oriented to name, place and time. Palsy of cranial nerve IV vs III & IV noted, otherwise cranial nerves II-XII are grossly intact. Motor is 5 out of 5 bilaterally. SKIN:    No bruising or bleeding. No redness, warmth, or swelling    EXTREMITIES:    Peripheral pulses present.   No edema, cyanosis, or swelling. LABORATORY DATA:  CBC with Differential:    Lab Results   Component Value Date    WBC 10.0 12/10/2020    RBC 4.97 12/10/2020    HGB 16.4 12/10/2020    HCT 46.9 12/10/2020     12/10/2020    MCV 94.4 12/10/2020    MCH 33.0 12/10/2020    MCHC 35.0 12/10/2020    RDW 12.5 12/10/2020    SEGSPCT 77 01/12/2012    LYMPHOPCT 18.2 12/10/2020    MONOPCT 5.5 12/10/2020    BASOPCT 0.7 12/10/2020    MONOSABS 0.55 12/10/2020    LYMPHSABS 1.81 12/10/2020    EOSABS 0.20 12/10/2020    BASOSABS 0.07 12/10/2020     BMP:    Lab Results   Component Value Date     12/10/2020    K 4.6 12/10/2020    K 3.9 12/06/2020     12/10/2020    CO2 23 12/10/2020    BUN 13 12/10/2020    LABALBU 4.1 12/06/2020    CREATININE 0.7 12/10/2020    CALCIUM 8.9 12/10/2020    GFRAA >60 12/10/2020    LABGLOM >60 12/10/2020    GLUCOSE 205 12/10/2020    GLUCOSE 224 01/12/2012     Ct Head Wo Contrast    Result Date: 12/10/2020  EXAMINATION: CT OF THE HEAD WITHOUT CONTRAST  12/10/2020 11:17 am TECHNIQUE: CT of the head was performed without the administration of intravenous contrast. Dose modulation, iterative reconstruction, and/or weight based adjustment of the mA/kV was utilized to reduce the radiation dose to as low as reasonably achievable. COMPARISON: 12/06/2020 HISTORY: ORDERING SYSTEM PROVIDED HISTORY: Evaluate intracranial abnormality TECHNOLOGIST PROVIDED HISTORY: Has a \"code stroke\" or \"stroke alert\" been called? ->No Reason for exam:->Evaluate intracranial abnormality FINDINGS: BRAIN/VENTRICLES: There is no acute intracranial hemorrhage, mass effect or midline shift. No abnormal extra-axial fluid collection. The gray-white differentiation is maintained without evidence of an acute infarct. There is no evidence of hydrocephalus. ORBITS: The visualized portion of the orbits demonstrate no acute abnormality. SINUSES: The visualized paranasal sinuses and mastoid air cells demonstrate no acute abnormality.  SOFT TISSUES/SKULL: No acute abnormality of the visualized skull or soft tissues. No acute intracranial abnormality. Cta Neck W Contrast    Result Date: 12/10/2020  EXAMINATION: CTA OF THE HEAD WITH CONTRAST; CTA OF THE NECK 12/10/2020 11:17 am: TECHNIQUE: CTA of the head/brain was performed with the administration of intravenous contrast. Multiplanar reformatted images are provided for review. MIP images are provided for review. Dose modulation, iterative reconstruction, and/or weight based adjustment of the mA/kV was utilized to reduce the radiation dose to as low as reasonably achievable.; CTA of the neck was performed with the administration of intravenous contrast. Multiplanar reformatted images are provided for review. MIP images are provided for review. Stenosis of the internal carotid arteries measured using NASCET criteria. Dose modulation, iterative reconstruction, and/or weight based adjustment of the mA/kV was utilized to reduce the radiation dose to as low as reasonably achievable. COMPARISON: Unenhanced head CT dated 12/06/2020 HISTORY: ORDERING SYSTEM PROVIDED HISTORY: HA, left facial droop. Visual changes TECHNOLOGIST PROVIDED HISTORY: Reason for exam:->HA, left facial droop. Visual changes Has a \"code stroke\" or \"stroke alert\" been called? ->No; ORDERING SYSTEM PROVIDED HISTORY: Left facial droop. Vision changes TECHNOLOGIST PROVIDED HISTORY: Reason for exam:->Left facial droop. Vision changes Has a \"code stroke\" or \"stroke alert\" been called? ->No FINDINGS: CTA NECK: AORTIC ARCH/ARCH VESSELS: No dissection or arterial injury. No significant stenosis of the brachiocephalic or subclavian arteries. CAROTID ARTERIES: No dissection, arterial injury, or hemodynamically significant stenosis by NASCET criteria. Minimal soft plaque is seen about both carotid bulbs. There is 0% stenosis of both ICAs by NASCET criteria. VERTEBRAL ARTERIES: No dissection, arterial injury, or significant stenosis.  SOFT TISSUES: The lung apices are clear. No cervical or superior mediastinal lymphadenopathy. The larynx and pharynx are unremarkable. No acute abnormality of the salivary and thyroid glands. BONES: No acute osseous abnormality. CTA HEAD: ANTERIOR CIRCULATION: No significant stenosis of the intracranial internal carotid, anterior cerebral, or middle cerebral arteries. No aneurysm. POSTERIOR CIRCULATION: No significant stenosis of the vertebral, basilar, or posterior cerebral arteries. No aneurysm. OTHER: No dural venous sinus thrombosis on this non-dedicated study. BRAIN: No mass effect or midline shift. No extra-axial fluid collection. The gray-white differentiation is maintained. Unremarkable CTA of the head and neck. Cta Head W Contrast    Result Date: 12/10/2020  EXAMINATION: CTA OF THE HEAD WITH CONTRAST; CTA OF THE NECK 12/10/2020 11:17 am: TECHNIQUE: CTA of the head/brain was performed with the administration of intravenous contrast. Multiplanar reformatted images are provided for review. MIP images are provided for review. Dose modulation, iterative reconstruction, and/or weight based adjustment of the mA/kV was utilized to reduce the radiation dose to as low as reasonably achievable.; CTA of the neck was performed with the administration of intravenous contrast. Multiplanar reformatted images are provided for review. MIP images are provided for review. Stenosis of the internal carotid arteries measured using NASCET criteria. Dose modulation, iterative reconstruction, and/or weight based adjustment of the mA/kV was utilized to reduce the radiation dose to as low as reasonably achievable. COMPARISON: Unenhanced head CT dated 12/06/2020 HISTORY: ORDERING SYSTEM PROVIDED HISTORY: HA, left facial droop. Visual changes TECHNOLOGIST PROVIDED HISTORY: Reason for exam:->HA, left facial droop. Visual changes Has a \"code stroke\" or \"stroke alert\" been called? ->No; ORDERING SYSTEM PROVIDED HISTORY: Left facial droop. Vision changes TECHNOLOGIST PROVIDED HISTORY: Reason for exam:->Left facial droop. Vision changes Has a \"code stroke\" or \"stroke alert\" been called? ->No FINDINGS: CTA NECK: AORTIC ARCH/ARCH VESSELS: No dissection or arterial injury. No significant stenosis of the brachiocephalic or subclavian arteries. CAROTID ARTERIES: No dissection, arterial injury, or hemodynamically significant stenosis by NASCET criteria. Minimal soft plaque is seen about both carotid bulbs. There is 0% stenosis of both ICAs by NASCET criteria. VERTEBRAL ARTERIES: No dissection, arterial injury, or significant stenosis. SOFT TISSUES: The lung apices are clear. No cervical or superior mediastinal lymphadenopathy. The larynx and pharynx are unremarkable. No acute abnormality of the salivary and thyroid glands. BONES: No acute osseous abnormality. CTA HEAD: ANTERIOR CIRCULATION: No significant stenosis of the intracranial internal carotid, anterior cerebral, or middle cerebral arteries. No aneurysm. POSTERIOR CIRCULATION: No significant stenosis of the vertebral, basilar, or posterior cerebral arteries. No aneurysm. OTHER: No dural venous sinus thrombosis on this non-dedicated study. BRAIN: No mass effect or midline shift. No extra-axial fluid collection. The gray-white differentiation is maintained. Unremarkable CTA of the head and neck. 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 Based on my examination he has a palsy of cranial nerve IV with possible involvement of cranial nerve III. CTA of the head and neck are without abnormality however. Aspirin cannot be given due to his allergy, Plavix will be instituted.   Echocardiogram with bubble study will be performed and MRI of the brain will be undertaken. Neurology consultation will be undertaken as well. Symptomatic and supportive care. PT, OT and speech therapy consultations. We will assess lipid panel and A1c for overall risk reduction and medical optimization purposes. Underlying co-morbidites will be addressed during hospitalization as well. Labs and vital signs will be monitored closely and addressed accordingly. See additional orders for details.      ELLE Rivera  1:36 PM  12/10/2020    Electronically signed by CHARLOTTE Rivera CNP on 12/10/20 at 1:36 PM EST

## 2020-12-10 NOTE — PROGRESS NOTES
OCCUPATIONAL THERAPY  Initial Evaluation  Date:12/10/2020  Patient Name: Danika Pandey  MRN: [id number]  : 1965  ROOM #: 0325/0325-01     Referring Provider: Lyle Smith DO  Evaluating OT: Padma Link OTR/L [id number]    Placement Recommendation: Sarwat Ruiz if needed    Recommended Adaptive Equipment: TBD     [facility]   AM-PAC Daily Activity Inpatient   How much help for putting on and taking off regular lower body clothing?: A Little  How much help for Bathing?: A Little  How much help for Toileting?: A Little  How much help for putting on and taking off regular upper body clothing?: A Little  How much help for taking care of personal grooming?: A Little  How much help for eating meals?: None  AM-PAC Inpatient Daily Activity Raw Score: 19  AM-PAC Inpatient ADL T-Scale Score : 40.22  ADL Inpatient CMS 0-100% Score: 42.8  ADL Inpatient CMS G-Code Modifier : CK    Based on patient's functional performance as stated below and their level of assistance needed prior to admission, this therapist believes that the patient would benefit from skilled Occupational Therapy following their hospital stay in an effort to increase safety and independence with completion of ADL/IADL tasks for functional independence and quality of life. Diagnosis:   1. Facial droop    2. Acute nonintractable headache, unspecified headache type    3. Ptosis of eyelid, left    4. Visual changes      Pertinent Medical History:   Past Medical History:   Diagnosis Date    Back problem     slipped discs in neck and back-lower    COPD (chronic obstructive pulmonary disease) (HCC)     uses bipap at night    Diabetes mellitus ([address]     STABLE PER PT STATES HE DOESNT CHECK    Hypertension     STABLE PER PT    Hypertension 5-10-13 lexiscan stress test    Nausea & vomiting     denies    Neck problem     Sleep apnea     to begin use of cpap; to bring DOS    Testicular mass       MRI Brain 12/10/20  1. No evidence of acute infarct.    2. Trace chronic microvascular white matter ischemic disease. Precautions:  falls, L eye ptosis, mild L facial asymmetry  Pain Scale: Numeric Rate: 100/10 headache; Nursing notified. Social history: with family: wife and son     Home architecture: single family home, 2 story, 3 steps to enter with rail, pt resides on 1st floor, basement, walk in shower, tub shower. PLOF: independent with BADL and independent with IADL, pt ambulated with no device   Equipment owned: none   Cognition: A&O x 4; follows 3 step directions. good  Problem solving skills   good  Memory    good  Sequencing   good  Judgement/safety  Communication: intact   Visual perceptual skills: intact     Glasses: yes, readers    Edema: no    Sensation: intact   Hand Dominance:  Left     X Right     Left Right Comment   Passive range of motion [facility] WFL     Active range of motion [facility]     Muscle Grade 5/5 5/5    /pinch Strength Intact  Intact       Functional Assessment:   Initial Evaluation Status Date:   12/10/2020 Treatment Status  Date: STGs = LTGs  Time frame: 5 - 14 days   Feeding Independent to bring cup to mouth  Independent    Grooming Supervision   Independent    UB Dressing Supervision   Independent    LB Dressing Supervision   Independent    Bathing Supervision  Independent    Toileting Supervision to stand to urinate at commode   Independent    Bed Mobility  Facilitated Supine to sit: Supervision   Scooting:Supervision  Sit to supine: Supervision    Rolling: Supervision  Supine to sit: Independent   Scooting:Independent  Sit to supine: Independent   Rolling: Independent   Functional Transfers Supervision from EOB. Transfer training with verbal cues for hand placement throughout session to improve safety. Independent    Functional Mobility Minimal assist with no device to improve balance, verbal cues for walker sequence and safety.    Independent    Activity Tolerance Fair   Good      Coordination tests were completed - intact finger to nose, intact heel/knee/shin,  intact rapid alternating movements and intact finger opposition. Balance:   Sitting balance at EOB to increase dynamic sitting balance and activities tolerance for with supervision     Standing with no device   Endurance: fair     Comments: Upon arrival to the room the patient was supine. At end of the session, the patient was returned to bed after using bathroom and was side lying on the right. Call light and phone within reach. Pt required verbal cues and instruction as noted above for safe facilitation and completion of tasks. Therapist provided skilled monitoring of the patient's response during treatment session. Prior to and at the end of session, environmental modifications/line management completed for patients safety and efficiency of treatment session. OT services provided to include instruction/training on safety and adapted techniques for completion of transfers and ADL's. Overall, the patient demonstrates difficulties with completion of BADL's and IADL's. Factors contributing to these difficulties includes decreased endurance and generalized weakness. Pt would benefit from continued skilled OT to increase independence with BADL's and IADL's. Treatment:    Bed mobility: Facilitated bed mobility with cues for proper body mechanics and sequencing to prepare for ADL completion. Functional transfers: Facilitated transfers from various surfaces with cues for body alignment, safety and hand placement. ADL completion: Self-care retraining for the above-mentioned ADLs; training on proper hand placement, safety technique, sequencing, and energy conservation techniques. Postural Balance: Sitting and standing balance retraining to improve righting reactions with postural changes during ADLs. Skilled positioning: Proper positioning to improve interaction with environment, overall functioning and decrease/prevent edema and contractures.     Evaluation Complexity:   · Low Complexity  · History: Brief history including review of medical records relating to the problem  · Exam: 1-3 performance Deficits  · Assistance/Modification: No assistance or modifications required to perform tasks. No comorbities affecting occupational performance. Assessment of current deficits:   Functional mobility [x]        ADLs [x]        Strength [x]      Cognition []  Functional transfers  [x]       IADLs [x]        Safety Awareness []      Endurance [x]  Fine Motor Coordination []       Balance [x]       Vision/perception []     Sensation []   Gross Motor Coordination []       ROM []         Delirium []                          Motor Control []    Plan of Care: OT 1-3x/week for 5-7 days PRN   Instruction/training on adapted ADL techniques and AE recommendations to increased functional independence with precautions   Functional transfer/mobility training/DME recommendations for increased independence, safety, and fall prevention    Therapeutic activity to facilitate/challenge dynamic balance, standing tolerance, fine motor dexterity/in-hand manipulation for increased independence with ALD's    Functional Mobility Training   Training on energy conservation techniques/strategies to improve independence/tolerance for self care routine   Positioning to Improve Functional [place], Safety, and Skin Integrity   Patient/family education to increase follow through with safety techniques and functional independence   Visual perceptual training to improve environmental scanning, visual attention/focus, and oculomotor skills for increased safety/independence with functional transfers/mobility and ADL's  Therapeutic exercise to improve motor endurance, ROM, and functional strength for ALD's and functional transfers   Splinting/positioning for increased function, prevention of contractures, and improved skin integrity          Rehab Potential: Good for established goals developed with patient and family. Patient / Family Goal: lay down and get rid of headache      Patient and/or family were instructed on functional diagnosis, prognosis/goals and OT plan of care. Demonstrated fair understanding. Evaluation time includes thorough review of current medical information, gathering information on past medical history/social history and prior level of function, completion of standardized testing/informal observation of tasks, assessment of data, and development of POC/Goals.     Time In: 3:05pm   Time Out: 3:25pm                 Min Units   OT Eval Low 21825 X     OT Eval Medium 19397     OT Eval High 70697     OT Re-Eval [id number]          ADL/Self Care 90809 10    Therapeutic Activities 43859 3     Therapeutic Ex 47195     Orthotic Management 08457     Neuro Re-Ed 71246     Non-Billable Time     TOTAL TIMED TREATMENT [address] [place] OTR/L [id number]

## 2020-12-10 NOTE — ED PROVIDER NOTES
ED Attending  CC: No                                                                                                                                    Department of Emergency Medicine   ED  Provider Note  Admit Date/RoomTime: 12/10/2020  9:41 AM  ED Room: 20/20        HPI:  12/10/20,   Time: 10:04 AM CAPRICE Mena is a 54 y.o. male presenting to the ED for headache and left eye pain with facial droop, beginning 1 week ago. The complaint has been persistent, moderate in severity, and worsened by nothing. The patient presents today after being seen by an ophthalmologist.  He brings with him notes from the ophthalmologist reporting concerns about the headache and visual changes in the left eye as well as left eye droop. The ophthalmologist is recommending further imaging such as an MRI. The patient states that he has had symptoms for a week now. He reports fairly severe headache on the left side that has been constant. He states that his left eye has diminished vision with reported blurry and double vision. There was no injury, trauma or fall. The patient has never had anything like this before. No history of stroke. He has no other weakness in his arms or legs. Denies any speech deficits or difficulty chewing/swallowing food. Patient denies any confusion or mental deficits. There has been no drainage from the eye.         ROS:     Constitutional: Negative for fever and chills  HENT: Negative for ear pain, sore throat and sinus pressure  Eyes:  See HPI  Respiratory:  Negative for shortness of breath, cough and wheezing  Cardiovascular: Negative for CP, edema or palpitations  Gastrointestinal: Negative for nausea, vomiting, diarrhea and abdominal distention  Genitourinary: Negative for dysuria and frequency  Musculoskeletal: Negative for back pain and arthralgia  Skin: Negative for rash and wound  Neurological: See HPI  Hematological: Negative for adenopathy    All other systems reviewed and are negative      -------------------------------- PAST HISTORY ----------------------------------  Past Medical History:  has a past medical history of Back problem, COPD (chronic obstructive pulmonary disease) ([facility] Diabetes mellitus ([facility] Hypertension, Hypertension, Nausea & vomiting, Neck problem, Sleep apnea, and Testicular mass. Past Surgical History:  has a past surgical history that includes fracture surgery (Left); cyst removal; ECHO Compl W Dop Color Flow (5/10/2013); and Testicle removal (Left, 7/12/2013). Social History:  reports that he has been smoking cigarettes. He has a 55.50 pack-year smoking history. He has never used smokeless tobacco. He reports current alcohol use. He reports current drug use. Frequency: 1.00 time per week. Drug: Marijuana. Family History: family history is not on file. The patients home medications have been reviewed.     Allergies: Aspirin; Ibuprofen; and Penicillins    --------------------------------- RESULTS ------------------------------------------  All laboratory and radiology results have been personally reviewed by myself   LABS:  Results for orders placed or performed during the hospital encounter of 12/10/20   CBC Auto Differential   Result Value Ref Range    WBC 10.0 4.5 - 11.5 E9/L    RBC 4.97 3.80 - 5.80 E12/L    Hemoglobin 16.4 12.5 - 16.5 g/dL    Hematocrit 46.9 37.0 - 54.0 %    MCV 94.4 80.0 - 99.9 fL    MCH 33.0 26.0 - 35.0 pg    MCHC 35.0 (H) 32.0 - 34.5 %    RDW 12.5 11.5 - 15.0 fL    Platelets 312 945 - 042 E9/L    MPV 11.1 7.0 - 12.0 fL    Neutrophils % 73.4 43.0 - 80.0 %    Immature Granulocytes % 0.2 0.0 - 5.0 %    Lymphocytes % 18.2 (L) 20.0 - 42.0 %    Monocytes % 5.5 2.0 - 12.0 %    Eosinophils % 2.0 0.0 - 6.0 %    Basophils % 0.7 0.0 - 2.0 %    Neutrophils Absolute 7.30 1.80 - 7.30 E9/L    Immature Granulocytes # 0.02 E9/L    Lymphocytes Absolute 1.81 1.50 - 4.00 E9/L    Monocytes Absolute 0.55 0.10 - 0.95 E9/L    Eosinophils Absolute 0. 20 0.05 - 0.50 E9/L    Basophils Absolute 0.07 0.00 - 0.20 G2/E   Basic Metabolic Panel   Result Value Ref Range    Sodium 135 132 - 146 mmol/L    Potassium 4.6 3.5 - 5.0 mmol/L    Chloride 102 98 - 107 mmol/L    CO2 23 22 - 29 mmol/L    Anion Gap 10 7 - 16 mmol/L    Glucose 205 (H) 74 - 99 mg/dL    BUN 13 6 - 20 mg/dL    CREATININE 0.7 0.7 - 1.2 mg/dL    GFR Non-African American >60 >=60 mL/min/1.73    GFR African American >60     Calcium 8.9 8.6 - 10.2 mg/dL   SPECIMEN REJECTION   Result Value Ref Range    Rejected Test PT PTT     Reason for Rejection see below    Protime-INR   Result Value Ref Range    Protime 11.8 9.3 - 12.4 sec    INR 1.0    APTT   Result Value Ref Range    aPTT 24.1 (L) 24.5 - 35.1 sec       RADIOLOGY:  Interpreted by Radiologist.  CT HEAD WO CONTRAST   Final Result   No acute intracranial abnormality. CTA HEAD W CONTRAST   Final Result   Unremarkable CTA of the head and neck. CTA NECK W CONTRAST   Final Result   Unremarkable CTA of the head and neck. ----------------- NURSING NOTES AND VITALS REVIEWED ---------------   The nursing notes within the ED encounter and vital signs as below have been reviewed. BP (!) 145/80   Pulse 71   Temp 97.4 °F (36.3 °C) (Oral)   Resp 18   Ht 5' 5\" (1.651 m)   Wt 256 lb (116.1 kg)   SpO2 98%   BMI 42.60 kg/m²   Oxygen Saturation Interpretation: Normal      --------------------------------PHYSICAL EXAM------------------------------------      Constitutional/General: Alert and oriented x3, well appearing, non toxic in NAD  Head: NC/AT  Eyes: PERRL, EOMI. Both eyes dilated from opthalmology drops. Neither pupil is very reactive at this time. No visible trauma or erythema. He has upper eyelid Ptosis. Mild weakness noted as well in the musculature of the left forehead and face diffusely. I see a little bit less movement around his left mouth and in the nasolabial fold.   Mouth: Oropharynx clear, handling secretions, no trismus  Neck: Supple, full ROM, no meningeal signs  Pulmonary: Lungs clear to auscultation bilaterally, no wheezes, rales, or rhonchi. Not in respiratory distress  Cardiovascular:  Regular rate and rhythm, no murmurs, gallops, or rubs. 2+ distal pulses  Abdomen: Soft, + BS. No distension. Nontender. No palpable rigidity, rebound or guarding  Extremities: Moves all extremities x 4. Warm and well perfused  Skin: warm and dry without rash  Neurologic: GCS 15,  Intact. No focal deficits  Psych: Normal Affect      ------------------------ ED COURSE/MEDICAL DECISION MAKING----------------------  Medications   morphine injection 4 mg (4 mg Intravenous Given 12/10/20 1148)   ondansetron (ZOFRAN) injection 4 mg (4 mg Intravenous Given 12/10/20 1148)   iopamidol (ISOVUE-370) 76 % injection 100 mL (100 mLs Intravenous Given 12/10/20 1141)   diphenhydrAMINE (BENADRYL) injection 25 mg (25 mg Intravenous Given 12/10/20 1310)   metoclopramide (REGLAN) injection 10 mg (10 mg Intravenous Given 12/10/20 1310)         Medical Decision Making:    Acute onset of headache with left-sided vision changes and questionable facial droop x1 week. CAT scan of the head and a CTA of the head and neck are unremarkable. No acute aneurysm mass or bleed seen. Discussed admission with Dr. Gibson Palacios. The patient will likely need an MRI and seen by neurology. Dr. Mariama Adame is on call. He is agreeable to admit       Counseling: The emergency provider has spoken with the patient and discussed todays results, in addition to providing specific details for the plan of care and counseling regarding the diagnosis and prognosis. Questions are answered at this time and they are agreeable with the plan.      ------------------------ IMPRESSION AND DISPOSITION -------------------------------    IMPRESSION  1. Facial droop    2. Acute nonintractable headache, unspecified headache type    3. Ptosis of eyelid, left    4.  Visual changes DISPOSITION  Disposition: Admit to [facility]  Patient condition is stable                   Bigg Stevens PA-C  12/10/20 [id number]

## 2020-12-10 NOTE — PROGRESS NOTES
Limited echo with Definity completed. Limited d/t suboptimal images. Could not do bubble study also because of suboptimal images.

## 2020-12-11 LAB
ADENOVIRUS BY PCR: NOT DETECTED
BORDETELLA PARAPERTUSSIS BY PCR: NOT DETECTED
BORDETELLA PERTUSSIS BY PCR: NOT DETECTED
C-REACTIVE PROTEIN: 0.4 MG/DL (ref 0–0.4)
CHLAMYDOPHILIA PNEUMONIAE BY PCR: NOT DETECTED
CORONAVIRUS 229E BY PCR: NOT DETECTED
CORONAVIRUS HKU1 BY PCR: NOT DETECTED
CORONAVIRUS NL63 BY PCR: NOT DETECTED
CORONAVIRUS OC43 BY PCR: NOT DETECTED
HUMAN METAPNEUMOVIRUS BY PCR: NOT DETECTED
HUMAN RHINOVIRUS/ENTEROVIRUS BY PCR: NOT DETECTED
INFLUENZA A BY PCR: NOT DETECTED
INFLUENZA B BY PCR: NOT DETECTED
METER GLUCOSE: 119 MG/DL (ref 74–99)
METER GLUCOSE: 179 MG/DL (ref 74–99)
METER GLUCOSE: 200 MG/DL (ref 74–99)
METER GLUCOSE: 255 MG/DL (ref 74–99)
MYCOPLASMA PNEUMONIAE BY PCR: NOT DETECTED
PARAINFLUENZA VIRUS 1 BY PCR: NOT DETECTED
PARAINFLUENZA VIRUS 2 BY PCR: NOT DETECTED
PARAINFLUENZA VIRUS 3 BY PCR: NOT DETECTED
PARAINFLUENZA VIRUS 4 BY PCR: NOT DETECTED
RESPIRATORY SYNCYTIAL VIRUS BY PCR: NOT DETECTED
SARS-COV-2, PCR: NOT DETECTED
SEDIMENTATION RATE, ERYTHROCYTE: 8 MM/HR (ref 0–15)
TROPONIN: <0.01 NG/ML (ref 0–0.03)

## 2020-12-11 PROCEDURE — 6360000002 HC RX W HCPCS: Performed by: CLINICAL NURSE SPECIALIST

## 2020-12-11 PROCEDURE — 92507 TX SP LANG VOICE COMM INDIV: CPT

## 2020-12-11 PROCEDURE — G0378 HOSPITAL OBSERVATION PER HR: HCPCS

## 2020-12-11 PROCEDURE — 85651 RBC SED RATE NONAUTOMATED: CPT

## 2020-12-11 PROCEDURE — 6370000000 HC RX 637 (ALT 250 FOR IP): Performed by: PSYCHIATRY & NEUROLOGY

## 2020-12-11 PROCEDURE — 96365 THER/PROPH/DIAG IV INF INIT: CPT

## 2020-12-11 PROCEDURE — 0202U NFCT DS 22 TRGT SARS-COV-2: CPT

## 2020-12-11 PROCEDURE — 86696 HERPES SIMPLEX TYPE 2 TEST: CPT

## 2020-12-11 PROCEDURE — 6370000000 HC RX 637 (ALT 250 FOR IP): Performed by: NURSE PRACTITIONER

## 2020-12-11 PROCEDURE — 82962 GLUCOSE BLOOD TEST: CPT

## 2020-12-11 PROCEDURE — 6370000000 HC RX 637 (ALT 250 FOR IP): Performed by: INTERNAL MEDICINE

## 2020-12-11 PROCEDURE — 96366 THER/PROPH/DIAG IV INF ADDON: CPT

## 2020-12-11 PROCEDURE — 2580000003 HC RX 258: Performed by: NURSE PRACTITIONER

## 2020-12-11 PROCEDURE — 83519 RIA NONANTIBODY: CPT

## 2020-12-11 PROCEDURE — 96372 THER/PROPH/DIAG INJ SC/IM: CPT

## 2020-12-11 PROCEDURE — 83516 IMMUNOASSAY NONANTIBODY: CPT

## 2020-12-11 PROCEDURE — 86695 HERPES SIMPLEX TYPE 1 TEST: CPT

## 2020-12-11 PROCEDURE — 84484 ASSAY OF TROPONIN QUANT: CPT

## 2020-12-11 PROCEDURE — 86140 C-REACTIVE PROTEIN: CPT

## 2020-12-11 PROCEDURE — 2580000003 HC RX 258: Performed by: CLINICAL NURSE SPECIALIST

## 2020-12-11 PROCEDURE — 86694 HERPES SIMPLEX NES ANTBDY: CPT

## 2020-12-11 PROCEDURE — C9113 INJ PANTOPRAZOLE SODIUM, VIA: HCPCS | Performed by: NURSE PRACTITIONER

## 2020-12-11 PROCEDURE — 92523 SPEECH SOUND LANG COMPREHEN: CPT

## 2020-12-11 PROCEDURE — 6360000002 HC RX W HCPCS: Performed by: NURSE PRACTITIONER

## 2020-12-11 PROCEDURE — 97161 PT EVAL LOW COMPLEX 20 MIN: CPT | Performed by: PHYSICAL THERAPIST

## 2020-12-11 PROCEDURE — 36415 COLL VENOUS BLD VENIPUNCTURE: CPT

## 2020-12-11 PROCEDURE — 96376 TX/PRO/DX INJ SAME DRUG ADON: CPT

## 2020-12-11 RX ORDER — ACETAMINOPHEN AND CODEINE PHOSPHATE 60; 300 MG/1; MG/1
1 TABLET ORAL EVERY 6 HOURS PRN
Status: DISCONTINUED | OUTPATIENT
Start: 2020-12-11 | End: 2020-12-11

## 2020-12-11 RX ORDER — CIPROFLOXACIN HYDROCHLORIDE 3.5 MG/ML
1 SOLUTION/ DROPS TOPICAL EVERY 4 HOURS
Status: DISCONTINUED | OUTPATIENT
Start: 2020-12-11 | End: 2020-12-13 | Stop reason: HOSPADM

## 2020-12-11 RX ORDER — POLYVINYL ALCOHOL 14 MG/ML
1 SOLUTION/ DROPS OPHTHALMIC PRN
Status: DISCONTINUED | OUTPATIENT
Start: 2020-12-11 | End: 2020-12-13 | Stop reason: HOSPADM

## 2020-12-11 RX ORDER — ACETAMINOPHEN AND CODEINE PHOSPHATE 300; 30 MG/1; MG/1
1 TABLET ORAL EVERY 6 HOURS PRN
Status: DISCONTINUED | OUTPATIENT
Start: 2020-12-11 | End: 2020-12-13 | Stop reason: HOSPADM

## 2020-12-11 RX ADMIN — INSULIN LISPRO 1 UNITS: 100 INJECTION, SOLUTION INTRAVENOUS; SUBCUTANEOUS at 20:43

## 2020-12-11 RX ADMIN — PENTOXIFYLLINE 400 MG: 400 TABLET, EXTENDED RELEASE ORAL at 12:33

## 2020-12-11 RX ADMIN — METOPROLOL TARTRATE 25 MG: 25 TABLET, FILM COATED ORAL at 20:41

## 2020-12-11 RX ADMIN — SODIUM CHLORIDE, PRESERVATIVE FREE 10 ML: 5 INJECTION INTRAVENOUS at 08:35

## 2020-12-11 RX ADMIN — ACETAMINOPHEN AND CODEINE PHOSPHATE 1 TABLET: 300; 30 TABLET ORAL at 14:26

## 2020-12-11 RX ADMIN — ACYCLOVIR SODIUM 800 MG: 50 INJECTION, SOLUTION INTRAVENOUS at 22:50

## 2020-12-11 RX ADMIN — CIPROFLOXACIN 1 DROP: 3 SOLUTION OPHTHALMIC at 15:59

## 2020-12-11 RX ADMIN — PENTOXIFYLLINE 400 MG: 400 TABLET, EXTENDED RELEASE ORAL at 17:41

## 2020-12-11 RX ADMIN — CLOPIDOGREL BISULFATE 75 MG: 75 TABLET ORAL at 08:34

## 2020-12-11 RX ADMIN — INSULIN LISPRO 3 UNITS: 100 INJECTION, SOLUTION INTRAVENOUS; SUBCUTANEOUS at 12:33

## 2020-12-11 RX ADMIN — ACETAMINOPHEN AND CODEINE PHOSPHATE 1 TABLET: 300; 30 TABLET ORAL at 20:40

## 2020-12-11 RX ADMIN — METOPROLOL TARTRATE 25 MG: 25 TABLET, FILM COATED ORAL at 08:34

## 2020-12-11 RX ADMIN — ACETAMINOPHEN 650 MG: 325 TABLET, FILM COATED ORAL at 12:32

## 2020-12-11 RX ADMIN — SODIUM CHLORIDE, PRESERVATIVE FREE 10 ML: 5 INJECTION INTRAVENOUS at 20:39

## 2020-12-11 RX ADMIN — ACETAMINOPHEN 650 MG: 325 TABLET, FILM COATED ORAL at 00:00

## 2020-12-11 RX ADMIN — ATORVASTATIN CALCIUM 40 MG: 20 TABLET, FILM COATED ORAL at 20:41

## 2020-12-11 RX ADMIN — LEVOTHYROXINE SODIUM 50 MCG: 50 TABLET ORAL at 06:17

## 2020-12-11 RX ADMIN — ACYCLOVIR SODIUM 800 MG: 50 INJECTION, SOLUTION INTRAVENOUS at 11:15

## 2020-12-11 RX ADMIN — LOSARTAN POTASSIUM 25 MG: 25 TABLET, FILM COATED ORAL at 08:34

## 2020-12-11 RX ADMIN — PANTOPRAZOLE SODIUM 40 MG: 40 INJECTION, POWDER, FOR SOLUTION INTRAVENOUS at 08:33

## 2020-12-11 RX ADMIN — PENTOXIFYLLINE 400 MG: 400 TABLET, EXTENDED RELEASE ORAL at 08:34

## 2020-12-11 RX ADMIN — INSULIN LISPRO 1 UNITS: 100 INJECTION, SOLUTION INTRAVENOUS; SUBCUTANEOUS at 17:41

## 2020-12-11 RX ADMIN — ACETAMINOPHEN 650 MG: 325 TABLET, FILM COATED ORAL at 06:18

## 2020-12-11 RX ADMIN — CIPROFLOXACIN 1 DROP: 3 SOLUTION OPHTHALMIC at 20:39

## 2020-12-11 ASSESSMENT — PAIN SCALES - GENERAL
PAINLEVEL_OUTOF10: 8
PAINLEVEL_OUTOF10: 0
PAINLEVEL_OUTOF10: 10
PAINLEVEL_OUTOF10: 0
PAINLEVEL_OUTOF10: 10

## 2020-12-11 NOTE — CARE COORDINATION
12-11-Cm note: pt lives with his wife, he is independent, no DME , or hx of rehab. Pt plans on returning home at dc with no needs anticipated. Wife will provide transport home .  Electronically signed by Maybelle Sacks, RN on 12/11/2020 at 10:41 AM

## 2020-12-11 NOTE — CONSULTS
[address] [facility]  Consult Note    [address] [facility] [address]  [place], [address] [place]  Phone [phone]   [phone]      Admit Date: 12/10/2020  9:41 AM  Pt Name: Ramon Torres  MRN: [id number]  : 1965  Reason for Consult:    Chief Complaint   Patient presents with    Headache     times one week    Eye Pain     left eye ,sent in by eye doctor for CT     Requesting Physician:  Nick Seay DO  PCP: Mike Dyer DO  History Obtained From:  patient  ID consulted for Facial droop [R29.810]  on hospital day 0  CHIEF COMPLAINT       Chief Complaint   Patient presents with    Headache     times one week    Eye Pain     left eye ,sent in by eye doctor for CT   + headache  + left eye/ left side of face pain    HISTORYOF PRESENT ILLNESS      Ramon Torres is a 54 y.o. male who presents with significant past medical history of  has a past medical history of Back problem, COPD (chronic obstructive pulmonary disease) ([address] Diabetes mellitus ([facility] [address] Hypertension, Hypertension, Nausea & vomiting, Neck problem, Sleep apnea, and Testicular mass. ED TRIAGEVITALS  BP: (!) 156/87, Temp: 98 °F (36.7 °C), Pulse: 72, Resp: 18, SpO2: 94 %     HPI  Patient reports headache- that has been new for last week and a half. He denies any fevers. Chills, nausea, vomiting, or diarrhea. He reports left side of face/ forehead is painful- extending to temporal area. No rash. He is still having headaches. Has a history of retinal occlusion per patient. In 2015- saw opthalmology - acute ischemic optic neuropathy   His wife is a home care nurse- he denies any sick contact. Neurology is following. Labs are pending  He is on room air- resting in bed. ID has been asked to evaluate and make recommendations.   REVIEW OF SYSTEMS    (2-9 systems for level 4, 10 or more for level 5)     REVIEW OFSYSTEMS:    CONSTITUTIONAL:   No fever, chills, weight loss  ALLERGIES:    No urticaria, hay fever,    EYES:     No blurry vision, loss of vision,eye pain  ENT:      No hearing loss, sore throat  CARDIOVASCULAR:   No chest pain or palpitations  RESPIRATORY:   No cough, sob  ENDOCRINE:    No increase thirst, urination   HEME-LYMPH:   No easy bruising or bleeding  GI:     No nausea, vomiting or diarrhea  :     No urinary complaints  NEURO:    No seizures, stroke, ++ HA  MUSCULOSKELETAL:  No muscle aches or pain, no jointpain  SKIN:     No rash or itch  PSYCH:    No depression or anxiety    CURRENT MEDICATIONS     Current Facility-Administered Medications:     atorvastatin (LIPITOR) tablet 40 mg, 40 mg, Oral, Nightly, Aleida Shannon, APRN - CNP, 40 mg at 12/10/20 2026    glyBURIDE (DIABETA) tablet 10 mg, 10 mg, Oral, Q12H, Aleida Shannon, APRN - CNP    levothyroxine (SYNTHROID) tablet 50 mcg, 50 mcg, Oral, Daily, Aleida Shannon, APRN - CNP, 50 mcg at 12/11/20 0617    losartan (COZAAR) tablet 25 mg, 25 mg, Oral, Daily, Aleida Shannon, APRN - CNP, 25 mg at 12/11/20 0834    metoprolol tartrate (LOPRESSOR) tablet 25 mg, 25 mg, Oral, BID, Aleida Shannon, APRN - CNP, 25 mg at 12/11/20 4972    pentoxifylline (TRENTAL) extended release tablet 400 mg, 400 mg, Oral, TID WC, Aleida Shannon, APRN - CNP, 400 mg at 12/11/20 0834    magnesium sulfate 1 g in dextrose 5% 100 mL IVPB, 1 g, Intravenous, PRN, Aleida Shannon, APRN - CNP    sodium phosphate 13.32 mmol in dextrose 5 % 250 mL IVPB, 0.16 mmol/kg (Adjusted), Intravenous, PRN **OR** sodium phosphate 26.67 mmol in dextrose 5 % 500 mL IVPB, 0.32 mmol/kg (Adjusted), Intravenous, PRN, Aleida Shannon, APRN - CNP    potassium chloride (KLOR-CON M) extended release tablet 40 mEq, 40 mEq, Oral, PRN **OR** potassium bicarb-citric acid (EFFER-K) effervescent tablet 40 mEq, 40 mEq, Oral, PRN **OR** potassium chloride 10 mEq/100 mL IVPB (Peripheral Line), 10 mEq, Intravenous, PRN, Aleida Shannon, APRN - CNP    pantoprazole (PROTONIX) injection 40 mg, 40 mg, Intravenous, Daily, Ronne Red, APRN - CNP, 40 mg at 12/11/20 6002    sodium chloride flush 0.9 % injection 10 mL, 10 mL, Intravenous, 2 times per day, Ronne Red, APRN - CNP, 10 mL at 12/11/20 0835    sodium chloride flush 0.9 % injection 10 mL, 10 mL, Intravenous, PRN, Ronne Red, APRN - CNP    acetaminophen (TYLENOL) tablet 650 mg, 650 mg, Oral, Q6H PRN, 650 mg at 12/11/20 0618 **OR** acetaminophen (TYLENOL) suppository 650 mg, 650 mg, Rectal, Q6H PRN, Ronne Red, APRN - CNP    magnesium hydroxide (MILK OF MAGNESIA) 400 MG/5ML suspension 30 mL, 30 mL, Oral, Daily PRN, Ronne Red, APRN - CNP    promethazine (PHENERGAN) tablet 12.5 mg, 12.5 mg, Oral, Q6H PRN **OR** ondansetron (ZOFRAN) injection 4 mg, 4 mg, Intravenous, Q6H PRN, Ronne Red, APRN - CNP    enoxaparin (LOVENOX) injection 40 mg, 40 mg, Subcutaneous, Daily, Ronne Red, APRN - CNP    clopidogrel (PLAVIX) tablet 75 mg, 75 mg, Oral, Daily, Ronne Red, APRN - CNP, 75 mg at 12/11/20 0834    glucose (GLUTOSE) 40 % oral gel 15 g, 15 g, Oral, PRN, Ronne Red, APRN - CNP    dextrose 50 % IV solution, 12.5 g, Intravenous, PRN, Ronne Red, APRN - CNP    glucagon (rDNA) injection 1 mg, 1 mg, Intramuscular, PRN, Ronne Red, APRN - CNP    dextrose 5 % solution, 100 mL/hr, Intravenous, PRN, Ronne Red, APRN - CNP    insulin lispro (HUMALOG) injection vial 0-6 Units, 0-6 Units, Subcutaneous, TID WC, Ronne Red, APRN - CNP    insulin lispro (HUMALOG) injection vial 0-3 Units, 0-3 Units, Subcutaneous, Nightly, Ronne Red, APRN - CNP  ALLERGIES     Aspirin; Ibuprofen; and Penicillins  There is no immunization history for the selected administration types on file for this patient.   PAST MEDICAL HISTORY     Past Medical History:   Diagnosis Date    Back problem     slipped discs in neck and back-lower    COPD (chronic obstructive pulmonary disease) ([facility] [address]     uses bipap at night    Diabetes mellitus ([facility] [address]     STABLE PER PT STATES HE DOESNT CHECK    Hypertension     STABLE PER PT    Hypertension 5-10-13 lexiscan stress test    Nausea & vomiting     denies    Neck problem     Sleep apnea     to begin use of cpap; to bring DOS    Testicular mass      SURGICAL HISTORY       Past Surgical History:   Procedure Laterality Date    CYST REMOVAL      tailbone    ECHO COMPL W DOP COLOR FLOW  5/10/2013         FRACTURE SURGERY Left     arm    TESTICLE REMOVAL Left 7/12/2013    radical orchiectomy     FAMILY HISTORY     History reviewed. No pertinent family history. Unsure of any medical history with parents.    SOCIAL HISTORY       Social History     Socioeconomic History    Marital status:      Spouse name: None    Number of children: None    Years of education: None    Highest education level: None   Occupational History    None   Social Needs    Financial resource strain: None    Food insecurity     Worry: None     Inability: None    Transportation needs     Medical: None     Non-medical: None   Tobacco Use    Smoking status: Current Every Day Smoker     Packs/day: 1.50     Years: 37.00     Pack years: 55.50     Types: Cigarettes    Smokeless tobacco: Never Used   Substance and Sexual Activity    Alcohol use: Yes     Comment: SOCIAL    Drug use: Yes     Frequency: 1.0 times per week     Types: Marijuana     Comment: USES FOR PAIN    Sexual activity: None   Lifestyle    Physical activity     Days per week: None     Minutes per session: None    Stress: None   Relationships    Social connections     Talks on phone: None     Gets together: None     Attends Taoism service: None     Active member of club or organization: None     Attends meetings of clubs or organizations: None     Relationship status: None    Intimate partner violence     Fear of current or ex partner: None     Emotionally abused: None     Physically abused: None     Forced sexual activity: None   Other Topics Concern    None   Social History Narrative    ** Merged History Encounter **        disabled- does not work  Lives with wife and son  Wife- is a home care nurse. PHYSICAL EXAM    (up to 7 for level 4, 8 or more forlevel 5)     ED Triage Vitals [12/10/20 0937]   BP Temp Temp Source Pulse Resp SpO2 Height Weight   (!) 123/96 97.4 °F (36.3 °C) Oral 82 20 96 % 5' 5\" (1.651 m) 256 lb (116.1 kg)     Vitals:    Vitals:    12/10/20 1815 12/10/20 2029 12/11/20 0143 12/11/20 0618   BP: (!) 115/58 115/62  (!) 156/87   Pulse: 75 68  72   Resp: 20   18   Temp: 97.8 °F (36.6 °C)   98 °F (36.7 °C)   TempSrc: Oral   Oral   SpO2: 97%   94%   Weight:   248 lb 8 oz (112.7 kg)    Height:         Physical Exam   Constitutional/General: Alert and oriented, + headache   Head: left temporal area with tenderness with palpation- left side of forehead and eye + pain. Eyes: PERRL, EOMI  Mouth: Normal mucosa, no thrush   Neck: Supple, full ROM,    Pulmonary: Lungs clear to auscultation bilaterally. Not in respiratory distress  Cardiovascular:  Regular rate and rhythm. Abdomen: Soft, + BS. No distension. Nontender. Extremities: Moves all extremities x 4. Warm and well perfused  Skin: Warm and dry without rash, tattoos   Neurologic:    No focal deficits  Psych: Normal Affect  PIV     DIAGNOSTICRESULTS   RADIOLOGY:   Ct Head Wo Contrast    Result Date: 12/10/2020  EXAMINATION: CT OF THE HEAD WITHOUT CONTRAST  12/10/2020 11:17 am TECHNIQUE: CT of the head was performed without the administration of intravenous contrast. Dose modulation, iterative reconstruction, and/or weight based adjustment of the mA/kV was utilized to reduce the radiation dose to as low as reasonably achievable. COMPARISON: 12/06/2020 HISTORY: ORDERING SYSTEM PROVIDED HISTORY: Evaluate intracranial abnormality TECHNOLOGIST PROVIDED HISTORY: Has a \"code stroke\" or \"stroke alert\" been called? ->No Reason for exam:->Evaluate intracranial abnormality FINDINGS: BRAIN/VENTRICLES: There is no acute review. MIP images are provided for review. Dose modulation, iterative reconstruction, and/or weight based adjustment of the mA/kV was utilized to reduce the radiation dose to as low as reasonably achievable.; CTA of the neck was performed with the administration of intravenous contrast. Multiplanar reformatted images are provided for review. MIP images are provided for review. Stenosis of the internal carotid arteries measured using NASCET criteria. Dose modulation, iterative reconstruction, and/or weight based adjustment of the mA/kV was utilized to reduce the radiation dose to as low as reasonably achievable. COMPARISON: Unenhanced head CT dated 12/06/2020 HISTORY: ORDERING SYSTEM PROVIDED HISTORY: HA, left facial droop. Visual changes TECHNOLOGIST PROVIDED HISTORY: Reason for exam:->HA, left facial droop. Visual changes Has a \"code stroke\" or \"stroke alert\" been called? ->No; ORDERING SYSTEM PROVIDED HISTORY: Left facial droop. Vision changes TECHNOLOGIST PROVIDED HISTORY: Reason for exam:->Left facial droop. Vision changes Has a \"code stroke\" or \"stroke alert\" been called? ->No FINDINGS: CTA NECK: AORTIC ARCH/ARCH VESSELS: No dissection or arterial injury. No significant stenosis of the brachiocephalic or subclavian arteries. CAROTID ARTERIES: No dissection, arterial injury, or hemodynamically significant stenosis by NASCET criteria. Minimal soft plaque is seen about both carotid bulbs. There is 0% stenosis of both ICAs by NASCET criteria. VERTEBRAL ARTERIES: No dissection, arterial injury, or significant stenosis. SOFT TISSUES: The lung apices are clear. No cervical or superior mediastinal lymphadenopathy. The larynx and pharynx are unremarkable. No acute abnormality of the salivary and thyroid glands. BONES: No acute osseous abnormality. CTA HEAD: ANTERIOR CIRCULATION: No significant stenosis of the intracranial internal carotid, anterior cerebral, or middle cerebral arteries. No aneurysm. POSTERIOR CIRCULATION: No significant stenosis of the vertebral, basilar, or posterior cerebral arteries. No aneurysm. OTHER: No dural venous sinus thrombosis on this non-dedicated study. BRAIN: No mass effect or midline shift. No extra-axial fluid collection. The gray-white differentiation is maintained. Unremarkable CTA of the head and neck. Cta Head W Contrast    Result Date: 12/10/2020  EXAMINATION: CTA OF THE HEAD WITH CONTRAST; CTA OF THE NECK 12/10/2020 11:17 am: TECHNIQUE: CTA of the head/brain was performed with the administration of intravenous contrast. Multiplanar reformatted images are provided for review. MIP images are provided for review. Dose modulation, iterative reconstruction, and/or weight based adjustment of the mA/kV was utilized to reduce the radiation dose to as low as reasonably achievable.; CTA of the neck was performed with the administration of intravenous contrast. Multiplanar reformatted images are provided for review. MIP images are provided for review. Stenosis of the internal carotid arteries measured using NASCET criteria. Dose modulation, iterative reconstruction, and/or weight based adjustment of the mA/kV was utilized to reduce the radiation dose to as low as reasonably achievable. COMPARISON: Unenhanced head CT dated 12/06/2020 HISTORY: ORDERING SYSTEM PROVIDED HISTORY: HA, left facial droop. Visual changes TECHNOLOGIST PROVIDED HISTORY: Reason for exam:->HA, left facial droop. Visual changes Has a \"code stroke\" or \"stroke alert\" been called? ->No; ORDERING SYSTEM PROVIDED HISTORY: Left facial droop. Vision changes TECHNOLOGIST PROVIDED HISTORY: Reason for exam:->Left facial droop. Vision changes Has a \"code stroke\" or \"stroke alert\" been called? ->No FINDINGS: CTA NECK: AORTIC ARCH/ARCH VESSELS: No dissection or arterial injury. No significant stenosis of the brachiocephalic or subclavian arteries.  CAROTID ARTERIES: No dissection, arterial injury, or hemodynamically significant stenosis by NASCET criteria. Minimal soft plaque is seen about both carotid bulbs. There is 0% stenosis of both ICAs by NASCET criteria. VERTEBRAL ARTERIES: No dissection, arterial injury, or significant stenosis. SOFT TISSUES: The lung apices are clear. No cervical or superior mediastinal lymphadenopathy. The larynx and pharynx are unremarkable. No acute abnormality of the salivary and thyroid glands. BONES: No acute osseous abnormality. CTA HEAD: ANTERIOR CIRCULATION: No significant stenosis of the intracranial internal carotid, anterior cerebral, or middle cerebral arteries. No aneurysm. POSTERIOR CIRCULATION: No significant stenosis of the vertebral, basilar, or posterior cerebral arteries. No aneurysm. OTHER: No dural venous sinus thrombosis on this non-dedicated study. BRAIN: No mass effect or midline shift. No extra-axial fluid collection. The gray-white differentiation is maintained. Unremarkable CTA of the head and neck. Mri Brain Wo Contrast    Result Date: 12/10/2020  EXAMINATION: MRI OF THE BRAIN WITHOUT CONTRAST  12/10/2020 1:57 pm TECHNIQUE: Multiplanar multisequence MRI of the brain was performed without the administration of intravenous contrast. COMPARISON: 12/10/2020 HISTORY: ORDERING SYSTEM PROVIDED HISTORY: left sided facial asymmetry, concern for stroke TECHNOLOGIST PROVIDED HISTORY: Reason for exam:->left sided facial asymmetry, concern for stroke FINDINGS: INTRACRANIAL STRUCTURES/VENTRICLES: No areas of restricted diffusion to suggest an acute infarct. The cerebral and cerebellar parenchyma demonstrate normal volume. There are a few scattered areas of increased T2 signal noted supratentorially, compatible with trace chronic microvascular white matter ischemic disease. No abnormal extra-axial fluid collections. The ventricles are proportional to the cerebral sulci. Normal major intracranial flow voids are noted.  There are no areas of blooming artifact noted on the gradient echo sequences to suggest sequela of acute or chronic hemorrhage. ORBITS: There are bilateral lamina papyracea fractures that are old. The orbits are otherwise unremarkable. SINUSES: There is scattered trace mucosal thickening in the maxillary sinuses. There is a left mastoid effusion. The right mastoid air cells are clear. BONES/SOFT TISSUES: The bone marrow signal intensity and craniocervical junction are unremarkable. Pituitary gland is normal in appearance. 1. No evidence of acute infarct. 2. Trace chronic microvascular white matter ischemic disease. LABS  Recent Labs     12/10/20  1015   WBC 10.0   HGB 16.4   HCT 46.9   MCV 94.4        Recent Labs     12/10/20  1015      K 4.6      CO2 23   BUN 13   CREATININE 0.7   GFRAA >60   LABGLOM >60   GLUCOSE 205*   CALCIUM 8.9     No results for input(s): PROCAL in the last 72 hours. No results found for: CRP  No results found for: SEDRATE  No results found for: CBRAYAK1H7  No results found for: [address]     12/10/20  1059 12/10/20  1503 12/10/20  1942 12/11/20  0153   TROPONINI  --  <0.01 <0.01 <0.01   INR 1.0  --   --   --    PROTIME 11.8  --   --   --      Lab Results   Component Value Date    CHOL 114 08/09/2019    TRIG 133 08/09/2019    HDL 30 08/09/2019    LDLCALC 57 08/09/2019    LABVLDL 27 08/09/2019     MICROBIOLOGY:     Cultures :   No results found for: BC, ORG  No results found for: BLOODCULT2, ORG    No results found for: WNDABS     Patient is a 54 y.o. male who presented with   Chief Complaint   Patient presents with    Headache     times one week    Eye Pain     left eye ,sent in by eye doctor for CT        FINAL IMPRESSION      1. Facial droop    2. Acute nonintractable headache, unspecified headache type    3. Ptosis of eyelid, left    4.  Visual changes    · possible hsv/ shingles  · Headache    · History of acute ischemic optic neuropathy in 2015    Plan: · Start Acyclovir   · check HSV IGG/ IGM  · Check respiratory panel   · Neurology following   · Monitor labs  · Check cultures    Imaging and labs were reviewed per medical records and any ID pertinent labs were addressed with the patient. The patient/FAMILY  was educated about the diagnosis, prognosis, indications, risks and benefits of treatment. An opportunity to ask questions was given to the patient/FAMILY and questions were answered. Thank you for involving me in the care of Ramon Torres. Please do not hesitate to call for any questions or concerns. Electronically signed by CHARLOTTE Jose on 12/11/2020 at 9:25 AM      As above    This is a face to face encounter with Ramon Torres on 12/11/20. I have performed and participated in the history, exam, medical decision making, and  POC  with the NURSE PRACTITIONER and provided the instruction and education regarding this patient's care. Imaging and labs were reviewed per medical records and any ID pertinent labs were addressed with the patient. The patient was educated about the diagnosis, prognosis, indications, risks and benefits of treatment. Pt had the opportunity to ask questions. All questions were answered. Pt has migraines and left eye visual dec  H/o aion  He has migraines  R/o hsv/vzv   acyclovir    Thank you for involving me in the care of Ramon Torres. Please do not hesitate to call for any questions or concerns.     Electronically signed by Jian Allan MD on 12/11/2020 at 1:59 PM    Phone [phone]  Fax [phone]
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
## 2020-12-11 NOTE — PROGRESS NOTES
Physical Therapy Initial Evaluation    Room #:  0325/0325-01  Patient Name: Nic Sheppard  YOB: 1965  MRN: [id number]    Referring Provider:   CHARLOTTE Millan CNP     Date of Service: 12/11/2020    Evaluating Physical Therapist: Wood Baca, PT #[id number]       Diagnosis:   Facial droop [R29.810]     headache and left eye pain with facial droop, beginning 1 week ago    Patient Active Problem List   Diagnosis    Testicular mass    Facial droop    CVA (cerebral vascular accident) [facility])        Tentative placement recommendation: Home Health Physical Therapy  if needed  Equipment recommendation: None      Prior Level of Function: Patient ambulated independently    Rehab Potential: good  for baseline    Past medical history:   Past Medical History:   Diagnosis Date    Back problem     slipped discs in neck and back-lower    COPD (chronic obstructive pulmonary disease) ([address]     uses bipap at night    Diabetes mellitus ([address]     STABLE PER PT STATES HE DOESNT CHECK    Hypertension     STABLE PER PT    Hypertension 5-10-13 lexiscan stress test    Nausea & vomiting     denies    Neck problem     Sleep apnea     to begin use of cpap; to bring DOS    Testicular mass      Past Surgical History:   Procedure Laterality Date    CYST REMOVAL      tailbone    ECHO COMPL W DOP COLOR FLOW  5/10/2013         FRACTURE SURGERY Left     arm    TESTICLE REMOVAL Left 7/12/2013    radical orchiectomy       Precautions: Up as tolerated, falls and rule out COVID-19 (after eval) ,    MRI brain  1. No evidence of acute infarct. 2. Trace chronic microvascular white matter ischemic disease.         SUBJECTIVE:    Social history: Patient lives with spouse and son in a two story home resides first  with 3 steps  to enter with Rail  Walk in shower basement, tub shower    Equipment owned: None,       [address] [facility]   How much difficulty turning over in bed?: A Little  How much difficulty sitting down on / standing up from a chair with arms?: A Little  How much difficulty moving from lying on back to sitting on side of bed?: A Little  How much help from another person moving to and from a bed to a chair?: A Little  How much help from another person needed to walk in hospital room?: A Little  How much help from another person for climbing 3-5 steps with a railing?: A Little  AM-PAC Inpatient Mobility Raw Score : 18  AM-PAC Inpatient T-Scale Score : 43.63  Mobility Inpatient CMS 0-100% Score: 46.58  Mobility Inpatient CMS G-Code Modifier : CK    Nursing cleared patient for PT evaluation. The admitting diagnosis and active problem list as listed above have been reviewed prior to the initiation of this evaluation. OBJECTIVE;   Initial Evaluation  Date: 12/11/2020 Treatment Date:     Short Term/ Long Term   Goals   Was pt agreeable to Eval/treatment? Yes    To be met in 1 days   Pain level   10/10  Left eye/head nursing aware     Bed Mobility    Rolling: Supervision     Supine to sit: Supervision     Sit to supine: Supervision     Scooting: Supervision     Rolling: Independent    Supine to sit:  Independent    Sit to supine: Independent    Scooting: Independent     Transfers Sit to stand: Supervision  Cues for hand placement and safety   Sit to stand: Independent     Ambulation    2 x 30 feet using  no device with Minimal assist of 1  for safety,  bumped into wall on right however keeping eyes semi closed during gait due to marked pain and light sensitivity   100 feet using  no device with Independent    Stair negotiation: ascended and descended   Not assessed       3 steps 1 rail with supervision    ROM Within functional limits        Strength BUE:  refer to OT eval  RLE:  4+/5  LLE:  4+/5       Balance Sitting EOB:  good    Dynamic Standing:  fair +  Sitting EOB:  good    Dynamic Standing: good       Patient is Alert & Oriented x person, place, time and situation and follows directions    Sensation:  Patient  denies numbness and tingling     Edema:  no    Endurance: fair  due to eye/head pain on left    Vitals:    Blood Pressure at rest   Blood Pressure during session     Heart Rate at rest   Heart Rate during session     SPO2 at rest  %  SPO2 during session  %     Patient education  Patient educated on role of Physical Therapy, risks of immobility, safety and plan of care and  importance of mobility while in hospital       Patient response to education:   Pt verbalized understanding Pt demonstrated skill Pt requires further education in this area   Yes Partial Yes      Treatment:  Patient practiced and was instructed/facilitated in the following treatment: Patient  assited to Edge of bed  Sat edge of bed 5 minutes with Supervision  to increase dynamic sitting balance and activity tolerance. no deficits noted with rapid alternating movement, no dysmetria. Ambulated in room x 2, returned to bed. Therapeutic Exercises:  not performed       At end of session, patient in bed with   call light and phone within reach,   all lines and tubes intact, nursing notified. Patient would benefit from continued skilled Physical Therapy to improve functional independence and quality of life. Patient's/ family goals   home get rid of pain      ASSESSMENT: Patient exhibits decreased strength, balance, coordination impairing functional mobility. Pain left eye/head limiting increased ambulation distance due to light sensitivity to walk in hallway.        Plan of Care:     -Transfers: Cues for hand placement, technique and safety  -Gait: Gait training and Standing activities to improve: base of support, weight shift, weight bearing    -Endurance: Utilize Supervised activities to increase level of endurance to allow for safe functional mobility including transfers and gait   -Stairs: Stair training with instruction on proper technique and hand placement on rail    Patient and or family understand(s) diagnosis, prognosis, and plan of care. Frequency of treatments: Patient will be seen    3-5 times/week. Time in  0900  Time out  0909    Total Treatment Time  0 minutes    Evaluation time includes thorough review of current medical information, gathering information on past medical history/social history and prior level of function, completion of standardized testing/informal observation of tasks, assessment of data, and development of Plan of care and goals.      CPT codes:  Low Complexity PT evaluation (05105)  No treatment    Edward Schulte, PT

## 2020-12-11 NOTE — PROGRESS NOTES
3/3    Delayed Recall:   Recalled 1/3     Long Term Recall:   Recalled Address, Birthdate, Age and Family    Organization/Problem Solving/Reasoning   Verbal Sequencing:   Functional        Verbal Problem solving:   Impaired          CLINICAL OBSERVATIONS NOTED DURING THE EVALUATION  Latent responses and Cueing was required                  The Speech Language Pathologist (SLP) completed education with the patient regarding results of evaluation. Explained that Speech Pathology intervention is warranted  at this time   Prognosis for improvements is fair     This plan will be re-evaluated and revised in 1 week  if warranted. Patient stated goals: Agreed with above,   Treatment goals discussed with Patient   The Patient understand(s) the diagnosis, prognosis and plan of care       CPT code:    03228  eval speech sound lang comprehension      The admitting diagnosis and active problem list, as listed below have been reviewed prior to initiation of this evaluation. ACTIVE PROBLEM LIST:   Patient Active Problem List   Diagnosis    Testicular mass    Facial droop    CVA (cerebral vascular accident) ([facility]       [facility] Center[facility]lysia[facility] [address] [id number]

## 2020-12-11 NOTE — CONSULTS
reflexes were absent bilaterally. Plantar responses were flexor bilaterally. Cerebellar exam noted finger to nose without dysmetria. Sensation was normal to joint position sense, light touch and a pin prick . Pradip Rutledge       Assessment:   Cephalgia with stroke like symptoms and negative CTA head and neck and negative brain mri  ?ptosis      Plan:   If sed rate and C reactive protein significantly elevated consider vascular surgery eval/?TA biopsy  Acetylcholine receptor antibody titer  Suggest out patient neuroophthalmology evaluation [facility]  Smoking cessation  Thank you for consultation

## 2020-12-11 NOTE — PROGRESS NOTES
Spoke with wife Zehra Lau and she elaborated on PMH of seeing [facility] 6-8 years ago where nerve damage to left eye was confirmed. Records from [facility] confirm Dx: acute ischemic optic neuropathy from 2015. Patito Jordan notified.

## 2020-12-11 NOTE — PROGRESS NOTES
Internal Medicine Progress Note    [facility]    Trion Marita. Christen Cardona., F.A.JANEO.I. Ayaan Rodríguez D.O., LIANA Cline D.O. Cain Bautista, MSN, APRN, NP-C  Abhishek Nation. Adry Benito, MSN, APRN-CNP     Primary Care Physician: Brian Shen DO   Admitting Physician:  Heladio Everett DO  Admission date and time: 12/10/2020  9:41 AM    Room:  [address] [place] [address]  Admitting diagnosis: Facial droop [R29.810]    Patient Name: Eric Garcia  MRN: [id number]    Date of Service: 12/11/2020     Subjective:    Flor Jefferson is a 54 y.o. male who was seen and examined today,12/11/2020, at the bedside. Flor Jefferson seemed very uncomfortable today. The patient complaining of photophobia with a sharp stabbing pain in his left eye. He also relates to having headaches for the past 8 days. He has had this problem before where he was diagnosed at [facility] clinic--diagnosed as ischemic optic neuritis. He complained of impaired vision of the left eye      No family present during my examination. Review of System:   Constitutional:   Denies fever or chills, weight loss or gain, fatigue or malaise. HEENT:   Denies ear pain, sore throat, sinus or eye problems. Left eye pain with associated photophobia, blurred vision and stabbing pain  Cardiovascular:   Denies any chest pain, irregular heartbeats, or palpitations. Respiratory:   Denies shortness of breath, coughing, sputum production, hemoptysis, or wheezing. Gastrointestinal:   Denies nausea, vomiting, diarrhea, or constipation. Denies any abdominal pain. Genitourinary:    Denies any urgency, frequency, hematuria. Voiding  without difficulty. Extremities:   Denies lower extremity swelling, edema or cyanosis. Neurology:    Denies any headache or focal neurological deficits, Denies generalized weakness or memory difficulty. Psch:   Denies being anxious or depressed. Musculoskeletal:    Denies  myalgias, joint complaints or back pain. Integumentary:   Denies any rashes, ulcers, or excoriations. Denies bruising. Tattoos  Hematologic/Lymphatic:  Denies bruising or bleeding. Physical Exam:  No intake/output data recorded. Intake/Output Summary (Last 24 hours) at 12/11/2020 0808  Last data filed at 12/10/2020 2024  Gross per 24 hour   Intake 120 ml   Output 100 ml   Net 20 ml   I/O last 3 completed shifts: In: 120 [P.O.:120]  Out: 100 [Urine:100]  Patient Vitals for the past 96 hrs (Last 3 readings):   Weight   12/11/20 0143 248 lb 8 oz (112.7 kg)   12/10/20 0937 256 lb (116.1 kg)     Vital Signs:   Blood pressure (!) 156/87, pulse 72, temperature 98 °F (36.7 °C), temperature source Oral, resp. rate 18, height 5' 5\" (1.651 m), weight 248 lb 8 oz (112.7 kg), SpO2 94 %. General appearance:  Alert, responsive, oriented to person, place, and time. Well preserved, alert, no distress. Head:  Normocephalic. No masses, lesions or tenderness. Eyes:  PERRLA. EOMI. left sclera injected. Buccal mucosa moist.Periorbital edema  ENT:  Ears normal. Mucosa normal.  Neck:    Supple. Trachea midline. No thyromegaly. No JVD. No bruits. Heart:    Rhythm regular. Rate controlled. No murmurs. Lungs:    Symmetrical. Clear to auscultation bilaterally. No wheezes. No rhonchi. No rales. Abdomen:   Soft. Non-tender. Non-distended. Bowel sounds positive. No organomegaly or masses. No pain on palpation. Extremities:    Peripheral pulses present. No peripheral edema. No ulcers. No cyanosis. No clubbing. Neurologic:    Alert x 3. No focal deficit. Cranial nerves grossly intact. No focal weakness. Psych:   Behavior is normal. Mood appears normal. Speech is not rapid and/or pressured. Musculoskeletal:   Spine ROM normal. Muscular strength intact. Gait not assessed. Integumentary:  No rashes  Skin normal color and texture.   Genitalia/Breast:  Deferred    Medication:  Scheduled Meds:   atorvastatin  40 mg Oral Nightly    glyBURIDE  10 mg Oral Q12H    levothyroxine  50 mcg Oral Daily    losartan  25 mg Oral Daily    metoprolol tartrate  25 mg Oral BID    pentoxifylline  400 mg Oral TID WC    pantoprazole  40 mg Intravenous Daily    sodium chloride flush  10 mL Intravenous 2 times per day    enoxaparin  40 mg Subcutaneous Daily    clopidogrel  75 mg Oral Daily    insulin lispro  0-6 Units Subcutaneous TID WC    insulin lispro  0-3 Units Subcutaneous Nightly     Continuous Infusions:   dextrose         Objective Data:  CBC with Differential:    Lab Results   Component Value Date    WBC 10.0 12/10/2020    RBC 4.97 12/10/2020    HGB 16.4 12/10/2020    HCT 46.9 12/10/2020     12/10/2020    MCV 94.4 12/10/2020    MCH 33.0 12/10/2020    MCHC 35.0 12/10/2020    RDW 12.5 12/10/2020    SEGSPCT 77 01/12/2012    LYMPHOPCT 18.2 12/10/2020    MONOPCT 5.5 12/10/2020    BASOPCT 0.7 12/10/2020    MONOSABS 0.55 12/10/2020    LYMPHSABS 1.81 12/10/2020    EOSABS 0.20 12/10/2020    BASOSABS 0.07 12/10/2020     CMP:    Lab Results   Component Value Date     12/10/2020    K 4.6 12/10/2020    K 3.9 12/06/2020     12/10/2020    CO2 23 12/10/2020    BUN 13 12/10/2020    CREATININE 0.7 12/10/2020    GFRAA >60 12/10/2020    LABGLOM >60 12/10/2020    GLUCOSE 205 12/10/2020    GLUCOSE 224 01/12/2012    PROT 7.6 12/06/2020    LABALBU 4.1 12/06/2020    CALCIUM 8.9 12/10/2020    BILITOT 0.4 12/06/2020    ALKPHOS 57 12/06/2020    AST 12 12/06/2020    ALT 17 12/06/2020            Assessment:    · Painful left eye with ptosis--history of ischemic optic neuritis  · Complex migraine  · Poorly controlled diabetes mellitus type 2  · Essential hypertension  · Hyperlipidemia  · Hypothyroidism  · Obesity      Plan:     · Patient had MRI and CTA which was unremarkable, he has been seen by neurology. He has been evaluated at [facility] in the past for ischemic optic neuritis.   Will follow recommendation including CRP and sed rate  · Ophthalmology evaluation--discussed with Dr. Isela Galaviz  · Infectious disease evaluation to rule out herpes. Patient currently is afebrile, doubtful of need for spinal tap  · Aggressive diabetic management--could be aggressive microvascular disease from diabetes  · Evaluate thyroids  · Risk modification            More than 50% of my time was spent at the bedside counseling/coordinating care with the patient and/or family with face to face contact. This time was spent reviewing notes and laboratory data as well as instructing and counseling the patient. Time I spent with the family or surrogate(s) is included only if the patient was incapable of providing the necessary information or participating in medical decisions. I also discussed the differential diagnosis and all of the proposed management plans with the patient and individuals accompanying the patient. I am readily available for any further decision-making and intervention.        Ibis Thompson DO, F.A.C.O.I.  12/11/2020  8:08 AM

## 2020-12-12 LAB
ANION GAP SERPL CALCULATED.3IONS-SCNC: 10 MMOL/L (ref 7–16)
BASOPHILS ABSOLUTE: 0.05 E9/L (ref 0–0.2)
BASOPHILS RELATIVE PERCENT: 0.4 % (ref 0–2)
BUN BLDV-MCNC: 12 MG/DL (ref 6–20)
CALCIUM SERPL-MCNC: 8.8 MG/DL (ref 8.6–10.2)
CHLORIDE BLD-SCNC: 99 MMOL/L (ref 98–107)
CO2: 26 MMOL/L (ref 22–29)
CREAT SERPL-MCNC: 0.7 MG/DL (ref 0.7–1.2)
EOSINOPHILS ABSOLUTE: 0.19 E9/L (ref 0.05–0.5)
EOSINOPHILS RELATIVE PERCENT: 1.7 % (ref 0–6)
GFR AFRICAN AMERICAN: >60
GFR NON-AFRICAN AMERICAN: >60 ML/MIN/1.73
GLUCOSE BLD-MCNC: 188 MG/DL (ref 74–99)
HCT VFR BLD CALC: 43.5 % (ref 37–54)
HEMOGLOBIN: 15.2 G/DL (ref 12.5–16.5)
IMMATURE GRANULOCYTES #: 0.03 E9/L
IMMATURE GRANULOCYTES %: 0.3 % (ref 0–5)
LYMPHOCYTES ABSOLUTE: 2.09 E9/L (ref 1.5–4)
LYMPHOCYTES RELATIVE PERCENT: 18.7 % (ref 20–42)
MAGNESIUM: 2.1 MG/DL (ref 1.6–2.6)
MCH RBC QN AUTO: 32.5 PG (ref 26–35)
MCHC RBC AUTO-ENTMCNC: 34.9 % (ref 32–34.5)
MCV RBC AUTO: 93.1 FL (ref 80–99.9)
METER GLUCOSE: 142 MG/DL (ref 74–99)
METER GLUCOSE: 194 MG/DL (ref 74–99)
METER GLUCOSE: 205 MG/DL (ref 74–99)
METER GLUCOSE: 306 MG/DL (ref 74–99)
MONOCYTES ABSOLUTE: 0.77 E9/L (ref 0.1–0.95)
MONOCYTES RELATIVE PERCENT: 6.9 % (ref 2–12)
NEUTROPHILS ABSOLUTE: 8.07 E9/L (ref 1.8–7.3)
NEUTROPHILS RELATIVE PERCENT: 72 % (ref 43–80)
PDW BLD-RTO: 12.2 FL (ref 11.5–15)
PHOSPHORUS: 3.2 MG/DL (ref 2.5–4.5)
PLATELET # BLD: 193 E9/L (ref 130–450)
PMV BLD AUTO: 11 FL (ref 7–12)
POTASSIUM SERPL-SCNC: 3.6 MMOL/L (ref 3.5–5)
RBC # BLD: 4.67 E12/L (ref 3.8–5.8)
SODIUM BLD-SCNC: 135 MMOL/L (ref 132–146)
WBC # BLD: 11.2 E9/L (ref 4.5–11.5)

## 2020-12-12 PROCEDURE — 96376 TX/PRO/DX INJ SAME DRUG ADON: CPT

## 2020-12-12 PROCEDURE — 83735 ASSAY OF MAGNESIUM: CPT

## 2020-12-12 PROCEDURE — 2580000003 HC RX 258: Performed by: CLINICAL NURSE SPECIALIST

## 2020-12-12 PROCEDURE — 6370000000 HC RX 637 (ALT 250 FOR IP): Performed by: PSYCHIATRY & NEUROLOGY

## 2020-12-12 PROCEDURE — 85025 COMPLETE CBC W/AUTO DIFF WBC: CPT

## 2020-12-12 PROCEDURE — 96372 THER/PROPH/DIAG INJ SC/IM: CPT

## 2020-12-12 PROCEDURE — 80048 BASIC METABOLIC PNL TOTAL CA: CPT

## 2020-12-12 PROCEDURE — 6360000002 HC RX W HCPCS: Performed by: NURSE PRACTITIONER

## 2020-12-12 PROCEDURE — 6360000002 HC RX W HCPCS: Performed by: CLINICAL NURSE SPECIALIST

## 2020-12-12 PROCEDURE — C9113 INJ PANTOPRAZOLE SODIUM, VIA: HCPCS | Performed by: NURSE PRACTITIONER

## 2020-12-12 PROCEDURE — 6370000000 HC RX 637 (ALT 250 FOR IP): Performed by: NURSE PRACTITIONER

## 2020-12-12 PROCEDURE — 6370000000 HC RX 637 (ALT 250 FOR IP): Performed by: INTERNAL MEDICINE

## 2020-12-12 PROCEDURE — 84100 ASSAY OF PHOSPHORUS: CPT

## 2020-12-12 PROCEDURE — 82962 GLUCOSE BLOOD TEST: CPT

## 2020-12-12 PROCEDURE — 36415 COLL VENOUS BLD VENIPUNCTURE: CPT

## 2020-12-12 PROCEDURE — 96366 THER/PROPH/DIAG IV INF ADDON: CPT

## 2020-12-12 PROCEDURE — G0378 HOSPITAL OBSERVATION PER HR: HCPCS

## 2020-12-12 RX ADMIN — ACETAMINOPHEN AND CODEINE PHOSPHATE 1 TABLET: 300; 30 TABLET ORAL at 06:06

## 2020-12-12 RX ADMIN — INSULIN LISPRO 1 UNITS: 100 INJECTION, SOLUTION INTRAVENOUS; SUBCUTANEOUS at 20:36

## 2020-12-12 RX ADMIN — LEVOTHYROXINE SODIUM 50 MCG: 50 TABLET ORAL at 06:04

## 2020-12-12 RX ADMIN — LOSARTAN POTASSIUM 25 MG: 25 TABLET, FILM COATED ORAL at 08:10

## 2020-12-12 RX ADMIN — PENTOXIFYLLINE 400 MG: 400 TABLET, EXTENDED RELEASE ORAL at 19:18

## 2020-12-12 RX ADMIN — ACYCLOVIR SODIUM 800 MG: 50 INJECTION, SOLUTION INTRAVENOUS at 06:04

## 2020-12-12 RX ADMIN — METOPROLOL TARTRATE 25 MG: 25 TABLET, FILM COATED ORAL at 08:09

## 2020-12-12 RX ADMIN — CIPROFLOXACIN 1 DROP: 3 SOLUTION OPHTHALMIC at 12:11

## 2020-12-12 RX ADMIN — PENTOXIFYLLINE 400 MG: 400 TABLET, EXTENDED RELEASE ORAL at 13:59

## 2020-12-12 RX ADMIN — GLYBURIDE 10 MG: 5 TABLET ORAL at 06:04

## 2020-12-12 RX ADMIN — INSULIN LISPRO 4 UNITS: 100 INJECTION, SOLUTION INTRAVENOUS; SUBCUTANEOUS at 16:07

## 2020-12-12 RX ADMIN — METOPROLOL TARTRATE 25 MG: 25 TABLET, FILM COATED ORAL at 20:35

## 2020-12-12 RX ADMIN — PANTOPRAZOLE SODIUM 40 MG: 40 INJECTION, POWDER, FOR SOLUTION INTRAVENOUS at 08:08

## 2020-12-12 RX ADMIN — CIPROFLOXACIN 1 DROP: 3 SOLUTION OPHTHALMIC at 08:13

## 2020-12-12 RX ADMIN — INSULIN LISPRO 1 UNITS: 100 INJECTION, SOLUTION INTRAVENOUS; SUBCUTANEOUS at 08:15

## 2020-12-12 RX ADMIN — ENOXAPARIN SODIUM 40 MG: 40 INJECTION SUBCUTANEOUS at 08:08

## 2020-12-12 RX ADMIN — ATORVASTATIN CALCIUM 40 MG: 20 TABLET, FILM COATED ORAL at 20:35

## 2020-12-12 RX ADMIN — ACYCLOVIR SODIUM 800 MG: 50 INJECTION, SOLUTION INTRAVENOUS at 13:59

## 2020-12-12 RX ADMIN — PENTOXIFYLLINE 400 MG: 400 TABLET, EXTENDED RELEASE ORAL at 08:10

## 2020-12-12 RX ADMIN — ACYCLOVIR SODIUM 800 MG: 50 INJECTION, SOLUTION INTRAVENOUS at 21:46

## 2020-12-12 RX ADMIN — ACETAMINOPHEN 650 MG: 325 TABLET, FILM COATED ORAL at 16:14

## 2020-12-12 RX ADMIN — ACETAMINOPHEN 650 MG: 325 TABLET, FILM COATED ORAL at 12:14

## 2020-12-12 RX ADMIN — INSULIN LISPRO 4 UNITS: 100 INJECTION, SOLUTION INTRAVENOUS; SUBCUTANEOUS at 12:10

## 2020-12-12 RX ADMIN — GLYBURIDE 10 MG: 5 TABLET ORAL at 19:18

## 2020-12-12 RX ADMIN — CIPROFLOXACIN 1 DROP: 3 SOLUTION OPHTHALMIC at 16:07

## 2020-12-12 ASSESSMENT — PAIN DESCRIPTION - FREQUENCY: FREQUENCY: CONTINUOUS

## 2020-12-12 ASSESSMENT — PAIN DESCRIPTION - PAIN TYPE: TYPE: ACUTE PAIN

## 2020-12-12 ASSESSMENT — PAIN SCALES - GENERAL
PAINLEVEL_OUTOF10: 3
PAINLEVEL_OUTOF10: 10
PAINLEVEL_OUTOF10: 3

## 2020-12-12 ASSESSMENT — PAIN DESCRIPTION - ORIENTATION: ORIENTATION: LEFT

## 2020-12-12 ASSESSMENT — PAIN DESCRIPTION - DESCRIPTORS: DESCRIPTORS: HEADACHE

## 2020-12-12 ASSESSMENT — PAIN DESCRIPTION - LOCATION: LOCATION: HEAD

## 2020-12-12 NOTE — CONSULTS
Patient seen today after discussion with Dr. Merlinda Guess yesterday    Admitted with headache, left eye pain and double vision. Pain seems to be better today according to patient    History of blurred vision left eye after previous ischemic optic neuropathy left eye. H&P and radiology results reviewed. 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 Navi Montenegro     Alternatively can see myself or colleague at [address] [facility]  for appointment. Thanks.

## 2020-12-12 NOTE — PROGRESS NOTES
[facility] Progress Note    Date:  12/12/20  Hospital Day:  Hospital Day: 3  PT Name:  Daina Jones  MRN:   [id number]  Primary Care Physician: Cynthia Hirsch DO  Requesting physician:   Yobani Eid DO    Reason for Consultation:  Reason for follow up: antibiotics poss hsv/vzv  Chief Complaint:   Chief Complaint   Patient presents with    Headache     times one week    Eye Pain     left eye ,sent in by eye doctor for CT     Subjective/HPI/:  Elana Ross was seen and examined at bedside today for poss vzv/hsv  Pt wants to go home  He can open his lef teys  Overnight: no issues  There are no adverse drug reactions noted including rash or itching. All patient questions were answered and all tests were reviewed. ROS:    Pt denies  fevers/chills  Pt denies nausea/vomiting/diarrhea  Pt denies chest pain  Pt denies sob/cough  Pt denies rash/itch  Pt denies joint aches or pain  Pt denies urinary complaints  Pt denies leg or calf pain  Pt denies easy bruising or bleeding     Assessment  Daina Jones is a 54y.o. year old male who presented on 12/10/2020 and is being treated for CVA (cerebral vascular accident) [facility])   Chief Complaint   Patient presents with    Headache     times one week    Eye Pain     left eye ,sent in by eye doctor for CT   left ey poss hsv/vzv  H/o acute ischemic optic neuropathy 2015     Plan    Pt wants to go home  Suggest cont acyclovir   picc  Will discuss with wife  · Watch for cdiff colitis/clabsi-line infection  · Monitor labs  · Continue current therapy. · Please see orders for further management and care.     Hospital Medications      acyclovir (ZOVIRAX) 800 mg in dextrose 5 % 250 mL IVPB, Q8H      acetaminophen-codeine (TYLENOL #3) 300-30 MG per tablet 1 tablet, Q6H PRN      ciprofloxacin (CILOXAN) 0.3 % ophthalmic solution 1 drop, Q4H      polyvinyl alcohol (LIQUIFILM TEARS) 1.4 % ophthalmic solution 1 drop, PRN      atorvastatin (LIPITOR) tablet 40 mg, Nightly   glyBURIDE (DIABETA) tablet 10 mg, Q12H      levothyroxine (SYNTHROID) tablet 50 mcg, Daily      losartan (COZAAR) tablet 25 mg, Daily      metoprolol tartrate (LOPRESSOR) tablet 25 mg, BID      pentoxifylline (TRENTAL) extended release tablet 400 mg, TID WC      magnesium sulfate 1 g in dextrose 5% 100 mL IVPB, PRN      sodium phosphate 13.32 mmol in dextrose 5 % 250 mL IVPB, PRN    Or      sodium phosphate 26.67 mmol in dextrose 5 % 500 mL IVPB, PRN      potassium chloride (KLOR-CON M) extended release tablet 40 mEq, PRN    Or      potassium bicarb-citric acid (EFFER-K) effervescent tablet 40 mEq, PRN    Or      potassium chloride 10 mEq/100 mL IVPB (Peripheral Line), PRN      pantoprazole (PROTONIX) injection 40 mg, Daily      sodium chloride flush 0.9 % injection 10 mL, 2 times per day      sodium chloride flush 0.9 % injection 10 mL, PRN      acetaminophen (TYLENOL) tablet 650 mg, Q6H PRN    Or      acetaminophen (TYLENOL) suppository 650 mg, Q6H PRN      magnesium hydroxide (MILK OF MAGNESIA) 400 MG/5ML suspension 30 mL, Daily PRN      promethazine (PHENERGAN) tablet 12.5 mg, Q6H PRN    Or      ondansetron (ZOFRAN) injection 4 mg, Q6H PRN      enoxaparin (LOVENOX) injection 40 mg, Daily      [Held by provider] clopidogrel (PLAVIX) tablet 75 mg, Daily      glucose (GLUTOSE) 40 % oral gel 15 g, PRN      dextrose 50 % IV solution, PRN      glucagon (rDNA) injection 1 mg, PRN      dextrose 5 % solution, PRN      insulin lispro (HUMALOG) injection vial 0-6 Units, TID WC      insulin lispro (HUMALOG) injection vial 0-3 Units, Nightly        PRN Medications Miscellaneous  Regular rhythm. Technically difficult study. Conclusions   Summary  Left ventricle is borderline normal in size . Moderate left ventricular concentric hypertrophy noted. Ejection fraction is visually estimated at 70%. No evidence of left ventricular mass or thrombus noted. The left atrium is mildly dilated. Unable to do bubble study  Physiologic and/or trace mitral regurgitation is present. No evidence of mitral valve stenosis. Physiologic and/or trace tricuspid regurgitation. Regular rhythm. Technically difficult study. Signature   ----------------------------------------------------------------  Electronically signed by Boby Amor DO(Interpreting  physician) on 12/10/2020 05:45 PM  ----------------------------------------------------------------  M-Mode/2D Measurements & Calculations   LV Diastolic          LV Systolic Dimension: 3.3 cm  Dimension: 5.6 cm     LV Volume Diastolic: 349.1 ml  LV DH:31.9 %          LV Volume Systolic: 94.3 ml  LV PW Diastolic: 1.5  LV EDV/LV EDV Index: 156.2 ml/71 ml/m^2LV ESV/LV  cm                    ESV Index: 44.8 ml/20ml/ m^2  Septum Diastolic: 1.5 EF Calculated: 71.3 %  cm                    LV Mass Index: 174 l/min*m^2   LV Mass: 383.69 g  Doppler Measurements & Calculations   MV Peak E-Wave: 0.97 m/s     AV Peak Velocity: 1.23  MV Peak A-Wave: 0.88 m/s     m/s  MV E/A Ratio: 1.1            AV Peak Gradient: 6.04  MV Peak Gradient: 2.7 mmHg   mmHg  MV Mean Gradient: 1.3 mmHg   AV Mean Velocity: 0.87  TR Velocity:2.68 m/s  MV Mean Velocity: 0.54 m/s   m/s                     TR Gradient:28.82  MV Deceleration Time: 210.6  AV Mean Gradient: 3.4   mmHg  msec                         mmHg  MV P1/2t: 61.2 msec          AV VTI: 27 cm  MVA by PHT:3.59 cm^2  [email or web address] [id number]    Ct Head Wo Contrast    Result Date: 12/10/2020 EXAMINATION: CT OF THE HEAD WITHOUT CONTRAST  12/10/2020 11:17 am TECHNIQUE: CT of the head was performed without the administration of intravenous contrast. Dose modulation, iterative reconstruction, and/or weight based adjustment of the mA/kV was utilized to reduce the radiation dose to as low as reasonably achievable. COMPARISON: 12/06/2020 HISTORY: ORDERING SYSTEM PROVIDED HISTORY: Evaluate intracranial abnormality TECHNOLOGIST PROVIDED HISTORY: Has a \"code stroke\" or \"stroke alert\" been called? ->No Reason for exam:->Evaluate intracranial abnormality FINDINGS: BRAIN/VENTRICLES: There is no acute intracranial hemorrhage, mass effect or midline shift. No abnormal extra-axial fluid collection. The gray-white differentiation is maintained without evidence of an acute infarct. There is no evidence of hydrocephalus. ORBITS: The visualized portion of the orbits demonstrate no acute abnormality. SINUSES: The visualized paranasal sinuses and mastoid air cells demonstrate no acute abnormality. SOFT TISSUES/SKULL:  No acute abnormality of the visualized skull or soft tissues. No acute intracranial abnormality.     Ct Head Wo Contrast    Result Date: 12/6/2020 EXAMINATION: CT OF THE HEAD WITHOUT CONTRAST  12/6/2020 4:19 pm TECHNIQUE: CT of the head was performed without the administration of intravenous contrast. Dose modulation, iterative reconstruction, and/or weight based adjustment of the mA/kV was utilized to reduce the radiation dose to as low as reasonably achievable. COMPARISON: None. HISTORY: ORDERING SYSTEM PROVIDED HISTORY: headache, hypertension TECHNOLOGIST PROVIDED HISTORY: Reason for exam:->headache, hypertension Has a \"code stroke\" or \"stroke alert\" been called? ->No FINDINGS: BRAIN/VENTRICLES: There is no acute intracranial hemorrhage, mass effect or midline shift. No abnormal extra-axial fluid collection. The gray-white differentiation is maintained without evidence of an acute infarct. There is no evidence of hydrocephalus. ORBITS: The visualized portion of the orbits demonstrate no acute abnormality. SINUSES: The visualized paranasal sinuses and mastoid air cells demonstrate no acute abnormality. SOFT TISSUES/SKULL:  No acute abnormality of the visualized skull or soft tissues. No acute intracranial abnormality.     Cta Neck W Contrast    Result Date: 12/10/2020 EXAMINATION: CTA OF THE HEAD WITH CONTRAST; CTA OF THE NECK 12/10/2020 11:17 am: TECHNIQUE: CTA of the head/brain was performed with the administration of intravenous contrast. Multiplanar reformatted images are provided for review. MIP images are provided for review. Dose modulation, iterative reconstruction, and/or weight based adjustment of the mA/kV was utilized to reduce the radiation dose to as low as reasonably achievable.; CTA of the neck was performed with the administration of intravenous contrast. Multiplanar reformatted images are provided for review. MIP images are provided for review. Stenosis of the internal carotid arteries measured using NASCET criteria. Dose modulation, iterative reconstruction, and/or weight based adjustment of the mA/kV was utilized to reduce the radiation dose to as low as reasonably achievable. COMPARISON: Unenhanced head CT dated 12/06/2020 HISTORY: ORDERING SYSTEM PROVIDED HISTORY: HA, left facial droop. Visual changes TECHNOLOGIST PROVIDED HISTORY: Reason for exam:->HA, left facial droop. Visual changes Has a \"code stroke\" or \"stroke alert\" been called? ->No; ORDERING SYSTEM PROVIDED HISTORY: Left facial droop. Vision changes TECHNOLOGIST PROVIDED HISTORY: Reason for exam:->Left facial droop. Vision changes Has a \"code stroke\" or \"stroke alert\" been called? ->No FINDINGS: CTA NECK: AORTIC ARCH/ARCH VESSELS: No dissection or arterial injury. No significant stenosis of the brachiocephalic or subclavian arteries. CAROTID ARTERIES: No dissection, arterial injury, or hemodynamically significant stenosis by NASCET criteria. Minimal soft plaque is seen about both carotid bulbs. There is 0% stenosis of both ICAs by NASCET criteria. VERTEBRAL ARTERIES: No dissection, arterial injury, or significant stenosis. SOFT TISSUES: The lung apices are clear. No cervical or superior mediastinal lymphadenopathy. The larynx and pharynx are unremarkable.   No acute abnormality of the salivary and thyroid glands. BONES: No acute osseous abnormality. CTA HEAD: ANTERIOR CIRCULATION: No significant stenosis of the intracranial internal carotid, anterior cerebral, or middle cerebral arteries. No aneurysm. POSTERIOR CIRCULATION: No significant stenosis of the vertebral, basilar, or posterior cerebral arteries. No aneurysm. OTHER: No dural venous sinus thrombosis on this non-dedicated study. BRAIN: No mass effect or midline shift. No extra-axial fluid collection. The gray-white differentiation is maintained. Unremarkable CTA of the head and neck.     Cta Head W Contrast    Result Date: 12/10/2020 salivary and thyroid glands. BONES: No acute osseous abnormality. CTA HEAD: ANTERIOR CIRCULATION: No significant stenosis of the intracranial internal carotid, anterior cerebral, or middle cerebral arteries. No aneurysm. POSTERIOR CIRCULATION: No significant stenosis of the vertebral, basilar, or posterior cerebral arteries. No aneurysm. OTHER: No dural venous sinus thrombosis on this non-dedicated study. BRAIN: No mass effect or midline shift. No extra-axial fluid collection. The gray-white differentiation is maintained. Unremarkable CTA of the head and neck.     Mri Brain Wo Contrast    Result Date: 12/10/2020 EXAMINATION: MRI OF THE BRAIN WITHOUT CONTRAST  12/10/2020 1:57 pm TECHNIQUE: Multiplanar multisequence MRI of the brain was performed without the administration of intravenous contrast. COMPARISON: 12/10/2020 HISTORY: ORDERING SYSTEM PROVIDED HISTORY: left sided facial asymmetry, concern for stroke TECHNOLOGIST PROVIDED HISTORY: Reason for exam:->left sided facial asymmetry, concern for stroke FINDINGS: INTRACRANIAL STRUCTURES/VENTRICLES: No areas of restricted diffusion to suggest an acute infarct. The cerebral and cerebellar parenchyma demonstrate normal volume. There are a few scattered areas of increased T2 signal noted supratentorially, compatible with trace chronic microvascular white matter ischemic disease. No abnormal extra-axial fluid collections. The ventricles are proportional to the cerebral sulci. Normal major intracranial flow voids are noted. There are no areas of blooming artifact noted on the gradient echo sequences to suggest sequela of acute or chronic hemorrhage. ORBITS: There are bilateral lamina papyracea fractures that are old. The orbits are otherwise unremarkable. SINUSES: There is scattered trace mucosal thickening in the maxillary sinuses. There is a left mastoid effusion. The right mastoid air cells are clear. BONES/SOFT TISSUES: The bone marrow signal intensity and craniocervical junction are unremarkable. Pituitary gland is normal in appearance. 1. No evidence of acute infarct. 2. Trace chronic microvascular white matter ischemic disease. Imaging and labs were reviewed per medical records and any ID pertinent labs were addressed with the patient. The patient was educated about the diagnosis, prognosis, indications, risks and benefits of treatment. Pt had the opportunity to ask questions. All questions were answered. Thank you for involving me in the care of Dewayne Khalil. Please do not hesitate to call for any questions or concerns. Electronically signed by Pritesh Davis MD on 12/12/2020 at 11:34 AM    Phone [phone]  Fax [phone]    Electronically signed by Pritesh Davis MD on 12/12/2020 at 11:34 AM

## 2020-12-12 NOTE — PROGRESS NOTES
Hematologic/Lymphatic:  Denies bruising or bleeding. Physical Exam:  No intake/output data recorded. Intake/Output Summary (Last 24 hours) at 12/12/2020 1512  Last data filed at 12/12/2020 1054  Gross per 24 hour   Intake 320 ml   Output    Net 320 ml   I/O last 3 completed shifts: In: 320 [P.O.:320]  Out: -   Patient Vitals for the past 96 hrs (Last 3 readings):   Weight   12/12/20 0531 245 lb 14.4 oz (111.5 kg)   12/11/20 0143 248 lb 8 oz (112.7 kg)   12/10/20 0937 256 lb (116.1 kg)     Vital Signs:   Blood pressure 135/71, pulse 71, temperature 98.7 °F (37.1 °C), temperature source Oral, resp. rate 18, height 5' 5\" (1.651 m), weight 245 lb 14.4 oz (111.5 kg), SpO2 98 %. General appearance:  Alert, responsive, oriented to person, place, and time. Well preserved, alert, no distress. Head:  Normocephalic. No masses, lesions or tenderness. Eyes:  PERRLA. EOMI. left sclera injected. Buccal mucosa moist.minimal periorbital edema today  ENT:  Ears normal. Mucosa normal.  Neck:    Supple. Trachea midline. No thyromegaly. No JVD. No bruits. Heart:    Rhythm regular. Rate controlled. No murmurs. Lungs:    Symmetrical. Clear to auscultation bilaterally. No wheezes. No rhonchi. No rales. Abdomen:   Soft. Non-tender. Non-distended. Bowel sounds positive. No organomegaly or masses. No pain on palpation. Extremities:    Peripheral pulses present. No peripheral edema. No ulcers. No cyanosis. No clubbing. Neurologic:    Alert x 3. No focal deficit. Cranial nerves grossly intact. No focal weakness. Psych:   Behavior is normal. Mood appears normal. Speech is not rapid and/or pressured. Musculoskeletal:   Spine ROM normal. Muscular strength intact. Gait not assessed. Integumentary:  No rashes  Skin normal color and texture.   Genitalia/Breast:  Deferred    Medication:  Scheduled Meds:   acyclovir  10 mg/kg (Adjusted) Intravenous Q8H    ciprofloxacin  1 drop Left Eye Q4H  atorvastatin  40 mg Oral Nightly    glyBURIDE  10 mg Oral Q12H    levothyroxine  50 mcg Oral Daily    losartan  25 mg Oral Daily    metoprolol tartrate  25 mg Oral BID    pentoxifylline  400 mg Oral TID WC    pantoprazole  40 mg Intravenous Daily    sodium chloride flush  10 mL Intravenous 2 times per day    enoxaparin  40 mg Subcutaneous Daily    [Held by provider] clopidogrel  75 mg Oral Daily    insulin lispro  0-6 Units Subcutaneous TID WC    insulin lispro  0-3 Units Subcutaneous Nightly     Continuous Infusions:   dextrose         Objective Data:  CBC with Differential:    Lab Results   Component Value Date    WBC 11.2 12/12/2020    RBC 4.67 12/12/2020    HGB 15.2 12/12/2020    HCT 43.5 12/12/2020     12/12/2020    MCV 93.1 12/12/2020    MCH 32.5 12/12/2020    MCHC 34.9 12/12/2020    RDW 12.2 12/12/2020    SEGSPCT 77 01/12/2012    LYMPHOPCT 18.7 12/12/2020    MONOPCT 6.9 12/12/2020    BASOPCT 0.4 12/12/2020    MONOSABS 0.77 12/12/2020    LYMPHSABS 2.09 12/12/2020    EOSABS 0.19 12/12/2020    BASOSABS 0.05 12/12/2020     CMP:    Lab Results   Component Value Date     12/12/2020    K 3.6 12/12/2020    K 3.9 12/06/2020    CL 99 12/12/2020    CO2 26 12/12/2020    BUN 12 12/12/2020    CREATININE 0.7 12/12/2020    GFRAA >60 12/12/2020    LABGLOM >60 12/12/2020    GLUCOSE 188 12/12/2020    GLUCOSE 224 01/12/2012    PROT 7.6 12/06/2020    LABALBU 4.1 12/06/2020    CALCIUM 8.8 12/12/2020    BILITOT 0.4 12/06/2020    ALKPHOS 57 12/06/2020    AST 12 12/06/2020    ALT 17 12/06/2020            Assessment:    · Painful left eye with ptosis--history of ischemic optic neuritis.  Incomplete CN-III palsy  · Complex migraine  · Poorly controlled diabetes mellitus type 2  · Essential hypertension  · Hyperlipidemia  · Hypothyroidism  · Obesity secondary to excess caloric intake      Plan: · Patient much improved today. Headache almost completely resolved. Patient's main complaint is pain in the eye socket itself but states it is much improved. · Patient is anxious for discharge home. · Infectious diseases following recommendations were reviewed. Recommendation is to continue acyclovir and for possible PICC line placement. .  · Multiple specialty labs are pending at this time. Await results and adjust plan accordingly. · Ophthalmology evaluation--Dr. Haley Frank discussed patient case with Dr. Isela Galaviz  · Aggressive diabetic management--could be aggressive microvascular disease from diabetes  · Evaluate thyroids  · Risk modification  · Recommended smoking cessation-risks of continued smoking and benefits of quitting discussed. Patient states he is thinking about it    More than 50% of my time was spent at the bedside counseling/coordinating care with the patient and/or family with face to face contact. This time was spent reviewing notes and laboratory data as well as instructing and counseling the patient. Time I spent with the family or surrogate(s) is included only if the patient was incapable of providing the necessary information or participating in medical decisions. I also discussed the differential diagnosis and all of the proposed management plans with the patient and individuals accompanying the patient. I am readily available for any further decision-making and intervention. The patient was seen, examined and then discussed with Dr. Haley Frank. CHARLOTTE Chaudhry CNP  12/12/2020  3:12 PM       I saw and evaluated the patient. I agree with the findings and the plan of care as documented in Lynn CONTEH's  note.     Bob Cowan D.O., Audrey Schneider  3:32 PM  12/12/2020

## 2020-12-13 VITALS
SYSTOLIC BLOOD PRESSURE: 157 MMHG | WEIGHT: 244 LBS | HEIGHT: 65 IN | HEART RATE: 78 BPM | TEMPERATURE: 97.8 F | RESPIRATION RATE: 18 BRPM | BODY MASS INDEX: 40.65 KG/M2 | OXYGEN SATURATION: 97 % | DIASTOLIC BLOOD PRESSURE: 71 MMHG

## 2020-12-13 LAB
ALBUMIN SERPL-MCNC: 3.7 G/DL (ref 3.5–5.2)
ALP BLD-CCNC: 49 U/L (ref 40–129)
ALT SERPL-CCNC: 11 U/L (ref 0–40)
ANION GAP SERPL CALCULATED.3IONS-SCNC: 11 MMOL/L (ref 7–16)
AST SERPL-CCNC: 12 U/L (ref 0–39)
BASOPHILS ABSOLUTE: 0.05 E9/L (ref 0–0.2)
BASOPHILS RELATIVE PERCENT: 0.5 % (ref 0–2)
BILIRUB SERPL-MCNC: 0.6 MG/DL (ref 0–1.2)
BUN BLDV-MCNC: 11 MG/DL (ref 6–20)
CALCIUM SERPL-MCNC: 8.8 MG/DL (ref 8.6–10.2)
CHLORIDE BLD-SCNC: 99 MMOL/L (ref 98–107)
CO2: 26 MMOL/L (ref 22–29)
CREAT SERPL-MCNC: 0.8 MG/DL (ref 0.7–1.2)
EOSINOPHILS ABSOLUTE: 0.18 E9/L (ref 0.05–0.5)
EOSINOPHILS RELATIVE PERCENT: 1.9 % (ref 0–6)
GFR AFRICAN AMERICAN: >60
GFR NON-AFRICAN AMERICAN: >60 ML/MIN/1.73
GLUCOSE BLD-MCNC: 131 MG/DL (ref 74–99)
HCT VFR BLD CALC: 44.9 % (ref 37–54)
HEMOGLOBIN: 16.1 G/DL (ref 12.5–16.5)
IMMATURE GRANULOCYTES #: 0.02 E9/L
IMMATURE GRANULOCYTES %: 0.2 % (ref 0–5)
LYMPHOCYTES ABSOLUTE: 2.21 E9/L (ref 1.5–4)
LYMPHOCYTES RELATIVE PERCENT: 23.5 % (ref 20–42)
MAGNESIUM: 2.1 MG/DL (ref 1.6–2.6)
MCH RBC QN AUTO: 32.7 PG (ref 26–35)
MCHC RBC AUTO-ENTMCNC: 35.9 % (ref 32–34.5)
MCV RBC AUTO: 91.1 FL (ref 80–99.9)
METER GLUCOSE: 130 MG/DL (ref 74–99)
METER GLUCOSE: 227 MG/DL (ref 74–99)
MONOCYTES ABSOLUTE: 0.72 E9/L (ref 0.1–0.95)
MONOCYTES RELATIVE PERCENT: 7.6 % (ref 2–12)
NEUTROPHILS ABSOLUTE: 6.24 E9/L (ref 1.8–7.3)
NEUTROPHILS RELATIVE PERCENT: 66.3 % (ref 43–80)
PDW BLD-RTO: 11.9 FL (ref 11.5–15)
PHOSPHORUS: 3.3 MG/DL (ref 2.5–4.5)
PLATELET # BLD: 205 E9/L (ref 130–450)
PMV BLD AUTO: 10.8 FL (ref 7–12)
POTASSIUM SERPL-SCNC: 3.6 MMOL/L (ref 3.5–5)
RBC # BLD: 4.93 E12/L (ref 3.8–5.8)
SODIUM BLD-SCNC: 136 MMOL/L (ref 132–146)
TOTAL PROTEIN: 6.9 G/DL (ref 6.4–8.3)
WBC # BLD: 9.4 E9/L (ref 4.5–11.5)

## 2020-12-13 PROCEDURE — 85025 COMPLETE CBC W/AUTO DIFF WBC: CPT

## 2020-12-13 PROCEDURE — 36415 COLL VENOUS BLD VENIPUNCTURE: CPT

## 2020-12-13 PROCEDURE — 82962 GLUCOSE BLOOD TEST: CPT

## 2020-12-13 PROCEDURE — 84100 ASSAY OF PHOSPHORUS: CPT

## 2020-12-13 PROCEDURE — 6360000002 HC RX W HCPCS: Performed by: NURSE PRACTITIONER

## 2020-12-13 PROCEDURE — 80053 COMPREHEN METABOLIC PANEL: CPT

## 2020-12-13 PROCEDURE — 6370000000 HC RX 637 (ALT 250 FOR IP): Performed by: NURSE PRACTITIONER

## 2020-12-13 PROCEDURE — G0378 HOSPITAL OBSERVATION PER HR: HCPCS

## 2020-12-13 PROCEDURE — 6360000002 HC RX W HCPCS: Performed by: CLINICAL NURSE SPECIALIST

## 2020-12-13 PROCEDURE — 2580000003 HC RX 258: Performed by: NURSE PRACTITIONER

## 2020-12-13 PROCEDURE — 83735 ASSAY OF MAGNESIUM: CPT

## 2020-12-13 PROCEDURE — 96376 TX/PRO/DX INJ SAME DRUG ADON: CPT

## 2020-12-13 PROCEDURE — C9113 INJ PANTOPRAZOLE SODIUM, VIA: HCPCS | Performed by: NURSE PRACTITIONER

## 2020-12-13 PROCEDURE — 2580000003 HC RX 258: Performed by: CLINICAL NURSE SPECIALIST

## 2020-12-13 PROCEDURE — 96366 THER/PROPH/DIAG IV INF ADDON: CPT

## 2020-12-13 RX ORDER — CLOPIDOGREL BISULFATE 75 MG/1
75 TABLET ORAL DAILY
Qty: 30 TABLET | Refills: 1 | Status: SHIPPED | OUTPATIENT
Start: 2020-12-13

## 2020-12-13 RX ORDER — POLYVINYL ALCOHOL 14 MG/ML
1 SOLUTION/ DROPS OPHTHALMIC PRN
Qty: 1 BOTTLE | Refills: 2 | Status: SHIPPED | OUTPATIENT
Start: 2020-12-13 | End: 2021-01-12

## 2020-12-13 RX ORDER — CIPROFLOXACIN HYDROCHLORIDE 3.5 MG/ML
1 SOLUTION/ DROPS TOPICAL EVERY 4 HOURS
Qty: 30 DROP | Refills: 0 | Status: SHIPPED | OUTPATIENT
Start: 2020-12-13 | End: 2020-12-18

## 2020-12-13 RX ADMIN — PENTOXIFYLLINE 400 MG: 400 TABLET, EXTENDED RELEASE ORAL at 09:17

## 2020-12-13 RX ADMIN — CIPROFLOXACIN 1 DROP: 3 SOLUTION OPHTHALMIC at 09:19

## 2020-12-13 RX ADMIN — ACYCLOVIR SODIUM 800 MG: 50 INJECTION, SOLUTION INTRAVENOUS at 05:51

## 2020-12-13 RX ADMIN — LOSARTAN POTASSIUM 25 MG: 25 TABLET, FILM COATED ORAL at 09:17

## 2020-12-13 RX ADMIN — PENTOXIFYLLINE 400 MG: 400 TABLET, EXTENDED RELEASE ORAL at 12:43

## 2020-12-13 RX ADMIN — SODIUM CHLORIDE, PRESERVATIVE FREE 10 ML: 5 INJECTION INTRAVENOUS at 09:18

## 2020-12-13 RX ADMIN — METOPROLOL TARTRATE 25 MG: 25 TABLET, FILM COATED ORAL at 09:17

## 2020-12-13 RX ADMIN — GLYBURIDE 10 MG: 5 TABLET ORAL at 05:52

## 2020-12-13 RX ADMIN — CIPROFLOXACIN 1 DROP: 3 SOLUTION OPHTHALMIC at 12:43

## 2020-12-13 RX ADMIN — PANTOPRAZOLE SODIUM 40 MG: 40 INJECTION, POWDER, FOR SOLUTION INTRAVENOUS at 09:18

## 2020-12-13 RX ADMIN — LEVOTHYROXINE SODIUM 50 MCG: 50 TABLET ORAL at 05:52

## 2020-12-13 RX ADMIN — ACETAMINOPHEN 650 MG: 325 TABLET, FILM COATED ORAL at 05:54

## 2020-12-13 ASSESSMENT — PAIN SCALES - GENERAL
PAINLEVEL_OUTOF10: 3
PAINLEVEL_OUTOF10: 5

## 2020-12-13 ASSESSMENT — PAIN DESCRIPTION - LOCATION: LOCATION: HEAD

## 2020-12-13 ASSESSMENT — PAIN DESCRIPTION - PAIN TYPE: TYPE: ACUTE PAIN

## 2020-12-13 ASSESSMENT — PAIN DESCRIPTION - ORIENTATION: ORIENTATION: LEFT

## 2020-12-13 ASSESSMENT — PAIN DESCRIPTION - DESCRIPTORS: DESCRIPTORS: HEADACHE

## 2020-12-13 NOTE — PROGRESS NOTES
[facility] Progress Note    Date:  12/13/20  Hospital Day:  Hospital Day: 4  PT Name:  Zoe Cary  MRN:   [id number]  Primary Care Physician: Rachael Andrade DO  Requesting physician:   Keron Carroll DO    Reason for Consultation:  Reason for follow up: antibiotics poss hsv/vzv  Chief Complaint:   Chief Complaint   Patient presents with    Headache     times one week    Eye Pain     left eye ,sent in by eye doctor for CT     Subjective/HPI/:  Christin De La Rosa was seen and examined at bedside today for poss vzv/hsv  Pt wants to go home  He can open his left eye  Overnight: no issues  No pain no f/c/n/v/d  There are no adverse drug reactions noted including rash or itching. All patient questions were answered and all tests were reviewed. ROS:    Pt denies  fevers/chills  Pt denies nausea/vomiting/diarrhea  Pt denies chest pain  Pt denies sob/cough  Pt denies rash/itch  Pt denies joint aches or pain  Pt denies urinary complaints  Pt denies leg or calf pain  Pt denies easy bruising or bleeding     Assessment  Zoe Cary is a 54y.o. year old male who presented on 12/10/2020 and is being treated for CVA (cerebral vascular accident) [facility])   Chief Complaint   Patient presents with    Headache     times one week    Eye Pain     left eye ,sent in by eye doctor for CT   left ey poss hsv/vzv titers pending -optho noted  H/o acute ischemic optic neuropathy 2015     Plan    Pt wants to go home  Will stop  acyclovir   Pt is planning to f/u with optho  Will see prn   Can d/c     · Watch for cdiff colitis/clabsi-line infection  · Monitor labs  · Continue current therapy. · Please see orders for further management and care.     Hospital Medications      insulin lispro (HUMALOG) injection vial 0-12 Units, TID WC      insulin lispro (HUMALOG) injection vial 0-6 Units, Nightly      acyclovir (ZOVIRAX) 800 mg in dextrose 5 % 250 mL IVPB, Q8H      acetaminophen-codeine (TYLENOL #3) 300-30 MG per tablet 1 tablet, Q6H PRN   ciprofloxacin (CILOXAN) 0.3 % ophthalmic solution 1 drop, Q4H      polyvinyl alcohol (LIQUIFILM TEARS) 1.4 % ophthalmic solution 1 drop, PRN      atorvastatin (LIPITOR) tablet 40 mg, Nightly      glyBURIDE (DIABETA) tablet 10 mg, Q12H      levothyroxine (SYNTHROID) tablet 50 mcg, Daily      losartan (COZAAR) tablet 25 mg, Daily      metoprolol tartrate (LOPRESSOR) tablet 25 mg, BID      pentoxifylline (TRENTAL) extended release tablet 400 mg, TID WC      pantoprazole (PROTONIX) injection 40 mg, Daily      sodium chloride flush 0.9 % injection 10 mL, PRN      acetaminophen (TYLENOL) tablet 650 mg, Q6H PRN    Or      acetaminophen (TYLENOL) suppository 650 mg, Q6H PRN      magnesium hydroxide (MILK OF MAGNESIA) 400 MG/5ML suspension 30 mL, Daily PRN      promethazine (PHENERGAN) tablet 12.5 mg, Q6H PRN    Or      ondansetron (ZOFRAN) injection 4 mg, Q6H PRN      enoxaparin (LOVENOX) injection 40 mg, Daily      [Held by provider] clopidogrel (PLAVIX) tablet 75 mg, Daily      glucose (GLUTOSE) 40 % oral gel 15 g, PRN      dextrose 50 % IV solution, PRN      glucagon (rDNA) injection 1 mg, PRN      dextrose 5 % solution, PRN        PRN Medications  acetaminophen-codeine, polyvinyl alcohol, sodium chloride flush, acetaminophen **OR** acetaminophen, magnesium hydroxide, promethazine **OR** ondansetron, glucose, dextrose, glucagon (rDNA), dextrose  Allergies   Allergen Reactions    Aspirin Swelling and Rash     Lips swell    Ibuprofen Swelling and Rash     Lips swell    Penicillins Rash     \"big rash around my neck\"     Objective  Most Recent Recorded Vitals  Vitals:    12/13/20 0800   BP:    Pulse: 78   Resp:    Temp:    SpO2: 97%     I/O last 3 completed shifts: In: 680 [P.O.:680]  Out: 0   I/O this shift:  In: 360 [P.O.:360]  Out: -   Physical Exam:  General:   AAO to person, place, time, and purpose,    Eyes:   Left eye open  Skin:   Color, texture, and turgor normal. No rashes or lesions. Lungs:   Clear to auscultation bilaterally. Heart:   Regular rate and regular rhythm, no murmur  Abdomen:   Soft, non-tender; distended bowel sounds normal, no masses,    Extremities:    no edema  Neurologic:   no focal deficits  Psych:    pleasant   Lines   PIv  gan    Microbiology   BLOOD CX #1  No results for input(s): BC in the last 72 hours. BLOOD CX #2  No results for input(s): Yuan Dunk in the last 72 hours.      SARS-COV-2 biomarkers  Recent Labs     12/10/20  1503 12/10/20  1942 12/11/20  0153 12/11/20  1259 12/13/20  0506   CRP  --   --   --  0.4  --    TROPONINI <0.01 <0.01 <0.01  --   --    AST  --   --   --   --  12   ALT  --   --   --   --  11     Lab Results   Component Value Date    CHOL 114 08/09/2019    TRIG 133 08/09/2019    HDL 30 08/09/2019    LDLCALC 57 08/09/2019    LABVLDL 27 08/09/2019           Echo Complete    Result Date: 12/10/2020 Miscellaneous  Regular rhythm. Technically difficult study. Conclusions   Summary  Left ventricle is borderline normal in size . Moderate left ventricular concentric hypertrophy noted. Ejection fraction is visually estimated at 70%. No evidence of left ventricular mass or thrombus noted. The left atrium is mildly dilated. Unable to do bubble study  Physiologic and/or trace mitral regurgitation is present. No evidence of mitral valve stenosis. Physiologic and/or trace tricuspid regurgitation. Regular rhythm. Technically difficult study. Signature   ----------------------------------------------------------------  Electronically signed by Boby Amor DO(Interpreting  physician) on 12/10/2020 05:45 PM  ----------------------------------------------------------------  M-Mode/2D Measurements & Calculations   LV Diastolic          LV Systolic Dimension: 3.3 cm  Dimension: 5.6 cm     LV Volume Diastolic: 660.8 ml  LV OK:19.3 %          LV Volume Systolic: 78.5 ml  LV PW Diastolic: 1.5  LV EDV/LV EDV Index: 156.2 ml/71 ml/m^2LV ESV/LV  cm                    ESV Index: 44.8 ml/20ml/ m^2  Septum Diastolic: 1.5 EF Calculated: 71.3 %  cm                    LV Mass Index: 174 l/min*m^2   LV Mass: 383.69 g  Doppler Measurements & Calculations   MV Peak E-Wave: 0.97 m/s     AV Peak Velocity: 1.23  MV Peak A-Wave: 0.88 m/s     m/s  MV E/A Ratio: 1.1            AV Peak Gradient: 6.04  MV Peak Gradient: 2.7 mmHg   mmHg  MV Mean Gradient: 1.3 mmHg   AV Mean Velocity: 0.87  TR Velocity:2.68 m/s  MV Mean Velocity: 0.54 m/s   m/s                     TR Gradient:28.82  MV Deceleration Time: 210.6  AV Mean Gradient: 3.4   mmHg  msec                         mmHg  MV P1/2t: 61.2 msec          AV VTI: 27 cm  MVA by PHT:3.59 cm^2  [email or web address] [id number]    Ct Head Wo Contrast    Result Date: 12/10/2020 EXAMINATION: CT OF THE HEAD WITHOUT CONTRAST  12/10/2020 11:17 am TECHNIQUE: CT of the head was performed without the administration of intravenous contrast. Dose modulation, iterative reconstruction, and/or weight based adjustment of the mA/kV was utilized to reduce the radiation dose to as low as reasonably achievable. COMPARISON: 12/06/2020 HISTORY: ORDERING SYSTEM PROVIDED HISTORY: Evaluate intracranial abnormality TECHNOLOGIST PROVIDED HISTORY: Has a \"code stroke\" or \"stroke alert\" been called? ->No Reason for exam:->Evaluate intracranial abnormality FINDINGS: BRAIN/VENTRICLES: There is no acute intracranial hemorrhage, mass effect or midline shift. No abnormal extra-axial fluid collection. The gray-white differentiation is maintained without evidence of an acute infarct. There is no evidence of hydrocephalus. ORBITS: The visualized portion of the orbits demonstrate no acute abnormality. SINUSES: The visualized paranasal sinuses and mastoid air cells demonstrate no acute abnormality. SOFT TISSUES/SKULL:  No acute abnormality of the visualized skull or soft tissues. No acute intracranial abnormality.     Ct Head Wo Contrast    Result Date: 12/6/2020 EXAMINATION: CT OF THE HEAD WITHOUT CONTRAST  12/6/2020 4:19 pm TECHNIQUE: CT of the head was performed without the administration of intravenous contrast. Dose modulation, iterative reconstruction, and/or weight based adjustment of the mA/kV was utilized to reduce the radiation dose to as low as reasonably achievable. COMPARISON: None. HISTORY: ORDERING SYSTEM PROVIDED HISTORY: headache, hypertension TECHNOLOGIST PROVIDED HISTORY: Reason for exam:->headache, hypertension Has a \"code stroke\" or \"stroke alert\" been called? ->No FINDINGS: BRAIN/VENTRICLES: There is no acute intracranial hemorrhage, mass effect or midline shift. No abnormal extra-axial fluid collection. The gray-white differentiation is maintained without evidence of an acute infarct. There is no evidence of hydrocephalus. ORBITS: The visualized portion of the orbits demonstrate no acute abnormality. SINUSES: The visualized paranasal sinuses and mastoid air cells demonstrate no acute abnormality. SOFT TISSUES/SKULL:  No acute abnormality of the visualized skull or soft tissues. No acute intracranial abnormality.     Cta Neck W Contrast    Result Date: 12/10/2020 EXAMINATION: CTA OF THE HEAD WITH CONTRAST; CTA OF THE NECK 12/10/2020 11:17 am: TECHNIQUE: CTA of the head/brain was performed with the administration of intravenous contrast. Multiplanar reformatted images are provided for review. MIP images are provided for review. Dose modulation, iterative reconstruction, and/or weight based adjustment of the mA/kV was utilized to reduce the radiation dose to as low as reasonably achievable.; CTA of the neck was performed with the administration of intravenous contrast. Multiplanar reformatted images are provided for review. MIP images are provided for review. Stenosis of the internal carotid arteries measured using NASCET criteria. Dose modulation, iterative reconstruction, and/or weight based adjustment of the mA/kV was utilized to reduce the radiation dose to as low as reasonably achievable. COMPARISON: Unenhanced head CT dated 12/06/2020 HISTORY: ORDERING SYSTEM PROVIDED HISTORY: HA, left facial droop. Visual changes TECHNOLOGIST PROVIDED HISTORY: Reason for exam:->HA, left facial droop. Visual changes Has a \"code stroke\" or \"stroke alert\" been called? ->No; ORDERING SYSTEM PROVIDED HISTORY: Left facial droop. Vision changes TECHNOLOGIST PROVIDED HISTORY: Reason for exam:->Left facial droop. Vision changes Has a \"code stroke\" or \"stroke alert\" been called? ->No FINDINGS: CTA NECK: AORTIC ARCH/ARCH VESSELS: No dissection or arterial injury. No significant stenosis of the brachiocephalic or subclavian arteries. CAROTID ARTERIES: No dissection, arterial injury, or hemodynamically significant stenosis by NASCET criteria. Minimal soft plaque is seen about both carotid bulbs. There is 0% stenosis of both ICAs by NASCET criteria. VERTEBRAL ARTERIES: No dissection, arterial injury, or significant stenosis. SOFT TISSUES: The lung apices are clear. No cervical or superior mediastinal lymphadenopathy. The larynx and pharynx are unremarkable.   No acute abnormality of the salivary and thyroid glands. BONES: No acute osseous abnormality. CTA HEAD: ANTERIOR CIRCULATION: No significant stenosis of the intracranial internal carotid, anterior cerebral, or middle cerebral arteries. No aneurysm. POSTERIOR CIRCULATION: No significant stenosis of the vertebral, basilar, or posterior cerebral arteries. No aneurysm. OTHER: No dural venous sinus thrombosis on this non-dedicated study. BRAIN: No mass effect or midline shift. No extra-axial fluid collection. The gray-white differentiation is maintained. Unremarkable CTA of the head and neck.     Cta Head W Contrast    Result Date: 12/10/2020 salivary and thyroid glands. BONES: No acute osseous abnormality. CTA HEAD: ANTERIOR CIRCULATION: No significant stenosis of the intracranial internal carotid, anterior cerebral, or middle cerebral arteries. No aneurysm. POSTERIOR CIRCULATION: No significant stenosis of the vertebral, basilar, or posterior cerebral arteries. No aneurysm. OTHER: No dural venous sinus thrombosis on this non-dedicated study. BRAIN: No mass effect or midline shift. No extra-axial fluid collection. The gray-white differentiation is maintained. Unremarkable CTA of the head and neck.     Mri Brain Wo Contrast    Result Date: 12/10/2020 EXAMINATION: MRI OF THE BRAIN WITHOUT CONTRAST  12/10/2020 1:57 pm TECHNIQUE: Multiplanar multisequence MRI of the brain was performed without the administration of intravenous contrast. COMPARISON: 12/10/2020 HISTORY: ORDERING SYSTEM PROVIDED HISTORY: left sided facial asymmetry, concern for stroke TECHNOLOGIST PROVIDED HISTORY: Reason for exam:->left sided facial asymmetry, concern for stroke FINDINGS: INTRACRANIAL STRUCTURES/VENTRICLES: No areas of restricted diffusion to suggest an acute infarct. The cerebral and cerebellar parenchyma demonstrate normal volume. There are a few scattered areas of increased T2 signal noted supratentorially, compatible with trace chronic microvascular white matter ischemic disease. No abnormal extra-axial fluid collections. The ventricles are proportional to the cerebral sulci. Normal major intracranial flow voids are noted. There are no areas of blooming artifact noted on the gradient echo sequences to suggest sequela of acute or chronic hemorrhage. ORBITS: There are bilateral lamina papyracea fractures that are old. The orbits are otherwise unremarkable. SINUSES: There is scattered trace mucosal thickening in the maxillary sinuses. There is a left mastoid effusion. The right mastoid air cells are clear. BONES/SOFT TISSUES: The bone marrow signal intensity and craniocervical junction are unremarkable. Pituitary gland is normal in appearance. 1. No evidence of acute infarct. 2. Trace chronic microvascular white matter ischemic disease. Imaging and labs were reviewed per medical records and any ID pertinent labs were addressed with the patient. The patient was educated about the diagnosis, prognosis, indications, risks and benefits of treatment. Pt had the opportunity to ask questions. All questions were answered. Thank you for involving me in the care of Elliott Day. Please do not hesitate to call for any questions or concerns. Electronically signed by Jhon Blount MD on 12/13/2020 at 12:01 PM    Phone [phone]  Fax [phone]    Electronically signed by Jhon Blount MD on 12/13/2020 at 12:01 PM

## 2020-12-14 NOTE — DISCHARGE SUMMARY
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 SECONDARY DISCHARGE DIAGNOSES:  Complex migraine, poorly controlled type  2 diabetes mellitus, essential hypertension, hyperlipidemia,  hypothyroidism, obesity secondary to excessive caloric intake, _____  nicotine      COMPLICATIONS:  None. OPERATIONS:  None. CONSULTATIONS OBTAINED:  Dr. Vijay Villafuerte, Dr. Nilton Orona, Dr. Alisia Puentes. No OMT was  given. ADMITTING PHYSICIANS:  Dr. Negra Allen and Dr. Byron Self. CHIEF COMPLAINT AND HISTORY OF CHIEF COMPLAINT:  This is a pleasant  77-year-old white male who was admitted to [address] [place] [address]. The  patient presented to the hospital here on 12/10/2020. The patient  presented to the hospital with what appeared to be severe left-sided  headache for multiple days; initially presented to urgent care. Prior  workup included that of  eye exam.  During the dilated exam, the  patient was noted to have slight sided ptosis and possible left-sided  facial asymmetry. The patient was sent to the emergency room for  evaluation of a stroke. The patient was admitted through the emergency  room under the service of Dr. Negra Allen and Dr. Byron Self. The patient was seen  at this time. PAST MEDICAL HISTORY:  Positive for the usual childhood diseases,  history of back problems, history of COPD, type 2 diabetes mellitus,  hypertension, history of nicotine use. PHYSICAL EXAMINATION:  VITAL SIGNS:  Showed blood pressure to be 145/81, pulse 71, respirations  18. Afebrile. GENERAL APPEARANCE:  At this time, revealed a 70-year-old white male. HEENT:  Normal with the exception of slight ptosis of the left eye. The  conjunctiva was injected. NECK:  Revealed adequate carotid upstrokes without bruits. Trachea  midline. Thyroid not palpable. LUNGS:  Clear. HEART:  Fairly regular without significant ectopy. No S3. No S4.  ABDOMEN:  Soft. EXTREMITIES:  Without cyanosis, clubbing, edema. While the patient was in the hospital, he had the following laboratory  studies performed:  Glucose was 227, BUN and creatinine were 11 and 0.8  respectively. Electrolytes were satisfactory. CMP was normal.   Hemoglobin and hematocrit were 16.1 and 44.9 respectively, white count  9000, platelet count adequate. HOSPITAL COURSE OF STAY:  The patient was admitted directly to the  hospital to the general medical floor. The patient was admitted under  the service of Dr. Carolee Jenkins and Dr. Earl Mora. The patient was initially seen  by Dr. Earl Mora at this time. The patient, at this time, was seen by Dr. Samuel Dill, at which time recommendation was followed out. We proceeded  with further workup. Diagnosis of incomplete palsy was noted of the  left eye. There was no evidence of any acute stroke noted by diagnostic  workup at this time; although, the patient was placed on Plavix along  with behavior modification. The patient underwent further evaluation at  this time including ID consult and evaluation by Dr. Yenifer Ruano. We wanted  to make sure he did not have herpes zoster ophthalmicus. Appropriate  lab work was obtained. He was seen by Ophthalmology and was felt not to  be herpes zoster ophthalmicus. The patient, at this time, clinically  improved. The following day, he was felt stable enough to be discharged  home on 12/13.     At the time of discharge on 12/13, revealed the following:  Blood pressure 157/71, pulse 66, respirations 18. He was afebrile. Further  lab work was satisfactory. The patient's glucose was improved at 142. His hemoglobin and hematocrit were 16.1 and 44.6 respectively. Sed rate  and CRP were normal.  CMP, otherwise, was satisfactory. The patient was  recommended to be on the following medications:  He was discharged home  on Lipitor 40 mg p.o. daily, ciprofloxacin eye drops to the left eye  every 4 hours, Plavix 75 daily, DiaBeta 10 mg daily, Synthroid 50 mcg  daily, Cozaar 25 daily, Lopressor 25 b.i.d., Trental 400 t.i.d., and  liquid tears as needed. The patient is recommended to avoid cigarette  smoking and behavior modification along with better glycemic control. The patient will be followed up by his primary care doctor on an  outpatient basis. The patient will be seeing by Dr. Davida Patino within the  next week. The patient is recommended more additional lipid  modification, cigarette cessation, and compliance with medications. The  patient is also recommended to follow up with Ophthalmology. More than 40 minutes were spent on the day of discharge with more than  50% of the time spent face-to-face with the patient discussing plan of  care and treatment at this time and the need for followup.         Vera Thurston DO    D: 12/13/2020 12:55:46       T: 12/14/2020 12:17:33     [email or web address]  Job#: [id number]     Doc#: [id number]    CC:

## 2020-12-15 LAB
ACETYLCHOLINE BINDING ANTIBODY: 0 NMOL/L (ref 0–0.4)
ACETYLCHOLINE BINDING ANTIBODY: 0 NMOL/L (ref 0–0.4)
ACETYLCHOLINE BLOCKING AB: 0 % (ref 0–26)
ACETYLCHOLINE BLOCKING AB: 0 % (ref 0–26)
VARICELLA ZOSTER AB IGM: 0.34 ISR
VARICELLA-ZOSTER VIRUS AB, IGG: NORMAL

## 2020-12-17 LAB
HERPES TYPE 1/2 IGM COMBINED: 1.13 IV
HERPES TYPE I/II IGG COMBINED: >22.4 IV
HSV 1 GLYCOPROTEIN G AB IGG: 50 IV
HSV 2 GLYCOPROTEIN G AB IGG: 0.15 IV

## 2022-08-09 ENCOUNTER — HOSPITAL ENCOUNTER (EMERGENCY)
Age: 57
Discharge: HOME | End: 2022-08-09
Payer: COMMERCIAL

## 2022-08-09 ENCOUNTER — APPOINTMENT (OUTPATIENT)
Dept: GENERAL RADIOLOGY | Age: 57
End: 2022-08-09
Payer: COMMERCIAL

## 2022-08-09 VITALS
TEMPERATURE: 97.9 F | OXYGEN SATURATION: 100 % | HEIGHT: 66 IN | SYSTOLIC BLOOD PRESSURE: 177 MMHG | WEIGHT: 265 LBS | DIASTOLIC BLOOD PRESSURE: 90 MMHG | BODY MASS INDEX: 42.59 KG/M2 | HEART RATE: 88 BPM | RESPIRATION RATE: 20 BRPM

## 2022-08-09 DIAGNOSIS — S20.211A CONTUSION OF RIB ON RIGHT SIDE, INITIAL ENCOUNTER: Primary | ICD-10-CM

## 2022-08-09 PROCEDURE — 99211 OFF/OP EST MAY X REQ PHY/QHP: CPT

## 2022-08-09 PROCEDURE — 71101 X-RAY EXAM UNILAT RIBS/CHEST: CPT

## 2022-08-09 ASSESSMENT — PAIN DESCRIPTION - ORIENTATION: ORIENTATION: LOWER;MID

## 2022-08-09 ASSESSMENT — PAIN SCALES - GENERAL: PAINLEVEL_OUTOF10: 10

## 2022-08-09 ASSESSMENT — PAIN DESCRIPTION - LOCATION: LOCATION: BACK

## 2022-08-09 ASSESSMENT — PAIN - FUNCTIONAL ASSESSMENT: PAIN_FUNCTIONAL_ASSESSMENT: 0-10

## 2022-08-09 ASSESSMENT — PAIN DESCRIPTION - DESCRIPTORS: DESCRIPTORS: SHARP;SHOOTING;SORE

## 2022-08-09 NOTE — ED PROVIDER NOTES
Department of Emergency Medicine  [address] [place] [address]  Provider Note  Admit Date/Time: 2022  1:45 PM  Room:   NAME: Amisha Card  : 1965  MRN: [id number]     Chief Complaint:  Jan Decree Sherrilee Bracket down 10 stairs this morning at 4 am -denies LOC or hitting head c/o back pain)    History of Present Illness        Amisha Card is a 64 y.o. male who has a past medical history of:   Past Medical History:   Diagnosis Date    Back problem     slipped discs in neck and back-lower    COPD (chronic obstructive pulmonary disease) ([address]     uses bipap at night    Diabetes mellitus ([address]     STABLE PER PT STATES HE DOESNT CHECK    Hypertension     STABLE PER PT    Hypertension 5-10-13 lexiscan stress test    Nausea & vomiting     denies    Neck problem     Sleep apnea     to begin use of cpap; to bring DOS    Testicular mass     presents to the urgent care center by private car for relation of right posterior rib pain. He slipped on the steps this morning he said there was a  on the steps and he stepped on it and he fell fell down 10 steps and injured his right rib cage he denies any head or neck injury denies any abdominal pain denies any other complaints. This happened around 4 am  this morning, a few hours ago  ROS    Pertinent positives and negatives are stated within HPI, all other systems reviewed and are negative. Past Surgical History:   Procedure Laterality Date    CYST REMOVAL      tailbone    ECHO COMPL W DOP COLOR FLOW  5/10/2013         FRACTURE SURGERY Left     arm    TESTICLE REMOVAL Left 2013    radical orchiectomy   Social History:  reports that he has been smoking cigarettes. He has a 55.50 pack-year smoking history. He has never used smokeless tobacco. He reports current alcohol use. He reports current drug use. Frequency: 1.00 time per week. Drug: Marijuana Darryle Pimple). Family History: family history is not on file.   Allergies: Aspirin, Ibuprofen, and Penicillins    Physical Exam   Oxygen Saturation Interpretation: Normal.   ED Triage Vitals [08/09/22 1351]   BP Temp Temp Source Heart Rate Resp SpO2 Height Weight   (!) 177/90 97.9 °F (36.6 °C) Infrared 88 20 100 % 5' 6\" (1.676 m) 265 lb (120.2 kg)       Physical Exam  Constitutional/General: Alert and oriented x3, well appearing, non toxic in NAD  HEENT:  NC/NT. Jacinda Kevinadelina Neck: Supple, full ROM, non tender to palpation in the midline, no stridor, no crepitus, no meningeal signs  Respiratory: Lungs clear to auscultation bilaterally, no wheezes, rales, or rhonchi. Not in respiratory distress  CV:  Regular rate. Regular rhythm. No murmurs, gallops, or rubs. 2+ distal pulses  Chest: tender in the right posterior rib cage area  GI:  Abdomen Soft, Non tender, Non distended. No CVA tenderness  Musculoskeletal: Moves all extremities x 4. Warm and well perfused, no clubbing, cyanosis, or edema. Capillary refill <3 seconds  Integument: skin warm and dry. No rashes. Large abrasion over there right posterior rib area  Lymphatic: no lymphadenopathy noted  Neurologic: GCS 15, no focal deficits, symmetric strength 5/5 in the upper and lower extremities bilaterally  Psychiatric: Normal Affect    Lab / Imaging Results   (All laboratory and radiology results have been personally reviewed by myself)  Labs:  No results found for this visit on 08/09/22. Imaging: All Radiology results interpreted by Radiologist unless otherwise noted. XR RIBS RIGHT INCLUDE CHEST (MIN 3 VIEWS)   Final Result   1. No airspace opacity or pleural effusion. 2. Stable chronic right 6th and 7th rib fractures. 3. No obvious acute rib fracture.              ED Course / Medical Decision Making   Medications - No data to display  ED Course as of 08/09/22 1510   Tue Aug 09, 2022   1505 XR RIBS RIGHT INCLUDE CHEST (MIN 3 VIEWS) [MC]      ED Course User Index  [MC] Jesus Lebron, APRN - CNP       Consult(s):   None        MDM:   Radiologist said there is no acute rib fractures but there are some stable old rib fractures present. The  patient states he does not ever remember of breaking his ribs. His lung looked okay no acute rib fractures he gets tramadol from his doctor routinely every month he can continue to take that as needed and follow-up with his PCP he was advised to take deep breaths every hour to keep his lungs inflated if he develops shortness of breath or worsening symptoms he should get reevaluated and he was advised of this. Plan of Care/Counseling:  I reviewed today's visit with the patient in addition to providing specific details for the plan of care and counseling regarding the diagnosis and prognosis. Questions are answered at this time and are agreeable with the plan. Assessment      1. Contusion of rib on right side, initial encounter      Plan   Discharge to home and advised to contact Damian Davidson DO  [address] [facility]  [phone]    Schedule an appointment as soon as possible for a visit    Patient condition is good    New Medications     New Prescriptions    No medications on file     Electronically signed by CHARLOTTE Albarran CNP   DD: 8/9/22  **This report was transcribed using voice recognition software. Every effort was made to ensure accuracy; however, inadvertent computerized transcription errors may be present.   END OF ED PROVIDER NOTE      CHARLOTTE Albarran CNP  08/09/22 6217

## 2023-01-08 ENCOUNTER — APPOINTMENT (OUTPATIENT)
Dept: GENERAL RADIOLOGY | Age: 58
End: 2023-01-08
Payer: COMMERCIAL

## 2023-01-08 ENCOUNTER — HOSPITAL ENCOUNTER (EMERGENCY)
Age: 58
Discharge: HOME | End: 2023-01-08
Payer: COMMERCIAL

## 2023-01-08 VITALS
HEART RATE: 78 BPM | RESPIRATION RATE: 16 BRPM | DIASTOLIC BLOOD PRESSURE: 86 MMHG | SYSTOLIC BLOOD PRESSURE: 173 MMHG | OXYGEN SATURATION: 97 % | TEMPERATURE: 98.2 F | WEIGHT: 265 LBS | BODY MASS INDEX: 42.77 KG/M2

## 2023-01-08 DIAGNOSIS — S93.492A SPRAIN OF ANTERIOR TALOFIBULAR LIGAMENT OF LEFT ANKLE, INITIAL ENCOUNTER: Primary | ICD-10-CM

## 2023-01-08 PROCEDURE — 99283 EMERGENCY DEPT VISIT LOW MDM: CPT

## 2023-01-08 PROCEDURE — 73590 X-RAY EXAM OF LOWER LEG: CPT

## 2023-01-08 PROCEDURE — 73610 X-RAY EXAM OF ANKLE: CPT

## 2023-01-08 PROCEDURE — 6370000000 HC RX 637 (ALT 250 FOR IP): Performed by: NURSE PRACTITIONER

## 2023-01-08 RX ORDER — TRAMADOL HYDROCHLORIDE 50 MG/1
50 TABLET ORAL EVERY 4 HOURS PRN
Qty: 18 TABLET | Refills: 0 | Status: SHIPPED | OUTPATIENT
Start: 2023-01-08 | End: 2023-01-11

## 2023-01-08 RX ORDER — TRAMADOL HYDROCHLORIDE 50 MG/1
50 TABLET ORAL ONCE
Status: COMPLETED | OUTPATIENT
Start: 2023-01-08 | End: 2023-01-08

## 2023-01-08 RX ADMIN — TRAMADOL HYDROCHLORIDE 50 MG: 50 TABLET, COATED ORAL at 15:41

## 2023-01-08 ASSESSMENT — PAIN SCALES - GENERAL
PAINLEVEL_OUTOF10: 10
PAINLEVEL_OUTOF10: 10
PAINLEVEL_OUTOF10: 8

## 2023-01-08 ASSESSMENT — PAIN - FUNCTIONAL ASSESSMENT
PAIN_FUNCTIONAL_ASSESSMENT: PREVENTS OR INTERFERES SOME ACTIVE ACTIVITIES AND ADLS
PAIN_FUNCTIONAL_ASSESSMENT: 0-10

## 2023-01-08 ASSESSMENT — PAIN DESCRIPTION - ONSET: ONSET: ON-GOING

## 2023-01-08 ASSESSMENT — PAIN DESCRIPTION - ORIENTATION
ORIENTATION: RIGHT
ORIENTATION: RIGHT

## 2023-01-08 ASSESSMENT — LIFESTYLE VARIABLES
HOW OFTEN DO YOU HAVE A DRINK CONTAINING ALCOHOL: NEVER
HOW MANY STANDARD DRINKS CONTAINING ALCOHOL DO YOU HAVE ON A TYPICAL DAY: PATIENT DOES NOT DRINK

## 2023-01-08 ASSESSMENT — PAIN DESCRIPTION - LOCATION
LOCATION: ANKLE
LOCATION: LEG;ANKLE

## 2023-01-08 ASSESSMENT — PAIN DESCRIPTION - PAIN TYPE: TYPE: ACUTE PAIN

## 2023-01-08 ASSESSMENT — PAIN DESCRIPTION - DESCRIPTORS
DESCRIPTORS: SHARP
DESCRIPTORS: DISCOMFORT;SORE

## 2023-01-08 ASSESSMENT — PAIN DESCRIPTION - FREQUENCY: FREQUENCY: CONTINUOUS

## 2023-01-08 NOTE — DISCHARGE INSTRUCTIONS
Rest, ice, elevation, tramadol as needed for pain. Keep the Aircast in place and use the crutches over the next 4 to 5 days. As the pain improves you may try ambulating with just the Aircast, if pain is not improving over the next 1 to 2 weeks please follow-up with orthopedics.

## 2023-01-08 NOTE — ED NOTES
Pt received crutches and air cast for right ankle, pt given discharge summary with education onankle sprain, pt aware to follow up with his primary care physician and he was informed regarding his prescription at his assigned pharmacy.   Pt also given info for an orthopedic surgeon regarding his above diagnosis     Rasheed Hart RN  01/08/23 0338

## 2023-01-08 NOTE — ED PROVIDER NOTES
Independent [facility] Visit. [address] [facility]  Department of Emergency Medicine   ED  Encounter Note  Admit Date/RoomTime: 2023  2:44 PM  ED Room:     NAME: Devin Nevarez  : 1965  MRN: [id number]     Chief Complaint:  Fall (Missed last step and fell, right leg into ankle )    History of Present Illness         Devin Nevarez is a 62 y.o. old male presenting to the emergency department by private vehicle, approximately 3-hour history of right lateral malleolus and right fibular tuberosity pain after missing the last step on his staircase while going downwards and inverting his right ankle and falling on to the right leg. He denies striking his head or any loss of consciousness, he is not any blood thinning agents. He has been able to ambulate using a cane but with the pain. He has not taken anything for the pain, he has not provided any at-home treatment including ice or elevation. He reports the pain is worse with ambulation, better with rest, rates his pain as a 10 out of 10. Tetanus Status: unknown. ROS   Pertinent positives and negatives are stated within HPI, all other systems reviewed and are negative. Past Medical History:  has a past medical history of Back problem, COPD (chronic obstructive pulmonary disease) ([facility] Diabetes mellitus ([facility] Hypertension, Hypertension, Nausea & vomiting, Neck problem, Sleep apnea, and Testicular mass. Surgical History:  has a past surgical history that includes fracture surgery (Left); cyst removal; ECHO Compl W Dop Color Flow (5/10/2013); and Testicle removal (Left, 2013). Social History:  reports that he has been smoking cigarettes. He has a 55.50 pack-year smoking history. He has never used smokeless tobacco. He reports current alcohol use. He reports current drug use. Frequency: 1.00 time per week. Drug: Marijuana Alfornia Cashing). Family History: family history is not on file.      Allergies: Aspirin, Ibuprofen, and Penicillins    Physical Exam   Oxygen Saturation Interpretation: Normal.        ED Triage Vitals   BP Temp Temp src Heart Rate Resp SpO2 Height Weight   01/08/23 1413 01/08/23 1413 -- 01/08/23 1413 01/08/23 1413 01/08/23 1413 -- 01/08/23 1437   (!) 194/101 97.8 °F (36.6 °C)  82 16 96 %  265 lb (120.2 kg)       Physical Exam  Constitutional:  Alert, development consistent with age. Neck:  Normal ROM. Supple. Right Ankle: Lateral malleolus              Tenderness:  moderate. Swelling: Mild. Deformity: no deformity observed/palpated. ROM: full range with pain. Skin:  no wounds, erythema, or swelling. Neurovascular: Motor deficit: none. Sensory deficit:   none. Pulse deficit: none. Capillary refill: normal.  Right Knee:              Tenderness:  mild. Over the lateral tibial tuberosity. Swelling: None. Deformity: no deformity observed/palpated. ROM: full range of motion, full range with pain. Skin:  no wounds, erythema, or swelling. Right Foot: diffusely across entire foot              Tenderness:  none. Swelling: none. Deformity: no deformity observed/palpated. ROM: full range of motion. Skin:  no wounds, erythema, or swelling  Gait:  unable to test due pain. Lymphatics: No lymphangitis or adenopathy noted. Neurological:  Oriented. Motor functions intact. Lab / Imaging Results   (All laboratory and radiology results have been personally reviewed by myself)  Labs:  No results found for this visit on 01/08/23. Imaging: All Radiology results interpreted by Radiologist unless otherwise noted. XR ANKLE RIGHT (MIN 3 VIEWS)   Final Result   Soft tissue swelling about the lateral malleolus without evidence of acute   fracture or dislocation of the right ankle or right tibia/fibula proximally.          XR TIBIA FIBULA RIGHT (2 VIEWS)   Final Result   Soft tissue swelling about the lateral malleolus without evidence of acute   fracture or dislocation of the right ankle or right tibia/fibula proximally. ED Course / Medical Decision Making     Medications   traMADol (ULTRAM) tablet 50 mg (50 mg Oral Given 1/8/23 8812)        Consults:   None    Procedure(s):  PROCEDURE NOTE  1/8/23       Time: 5821    SPLINT  APPLICATION  Risks, benefits and alternatives (for applicable procedures below) described. Performed By: Nursing staff. Indication:  strain of right ankle. Procedure:   A short  right leg Aircast splint was applied by nursing staff. The patient tolerated the procedure well. MDM:      This is a 60-year-old male who presents following a trip and fall down 1 step, inverting his right ankle. He has been having pain, swelling to the right ankle and up the right fibula since the time of the accident several hours ago. He has not used any over-the-counter treatments including rest, ice, elevation, ibuprofen. On exam there is moderate tenderness over the right lateral malleolus and right fibular head, moderate soft tissue swelling. Compartments of the calf are soft and nontender. X-ray of the right tib-fib and right ankle reveal no osseous abnormality, no acute fracture, no traumatic malalignment. Patient was able to ambulate on the right leg but with a great deal of pain so a Aircast was applied to the right ankle and patient was provided crutches, he demonstrated proficiency with crutches. He was provided a dose of Ultram with modest relief in pain. Blood pressure remained elevated during entirety of ER visit, patient was advised to follow-up closely with his PCP for this elevation in blood pressure, he denies any headache, dizziness, chest pain, shortness of breath. Patient was advised to return should any of these occur.   He was also advised to return should he develop any numbness or tingling to the leg, increased pain or inability to ambulate. Advised close follow-up with orthopedics should his symptoms not improve over the next 1 to 2 weeks. Patient was explicitly instructed on specific signs and symptoms on which to return to the emergency room for. Patient was instructed to return to the ER for any new or worsening symptoms. Additional discharge instructions were given verbally. All questions were answered. Patient is comfortable and agreeable with discharge plan. Patient in no acute distress and non-toxic in appearance. Plan of Care/Counseling:  CHARLOTTE Ring CNP reviewed today's visit with the patient in addition to providing specific details for the plan of care and counseling regarding the diagnosis and prognosis. Questions are answered at this time and are agreeable with the plan. Assessment      1. Sprain of anterior talofibular ligament of left ankle, initial encounter      Plan   Discharged home. Patient condition is good    New Medications     Discharge Medication List as of 1/8/2023  4:22 PM        START taking these medications    Details   traMADol (ULTRAM) 50 MG tablet Take 1 tablet by mouth every 4 hours as needed for Pain for up to 3 days. Intended supply: 3 days. Take lowest dose possible to manage pain Max Daily Amount: 300 mg, Disp-18 tablet, R-0Normal           Electronically signed by CHARLOTTE Ring CNP   DD: 1/8/23  **This report was transcribed using voice recognition software. Every effort was made to ensure accuracy; however, inadvertent computerized transcription errors may be present.   END OF ED PROVIDER NOTE      CHARLOTTE Ring CNP  01/11/23 3975

## 2023-07-01 ENCOUNTER — HOSPITAL ENCOUNTER (OUTPATIENT)
Age: 58
End: 2023-07-01
Payer: COMMERCIAL

## 2023-07-01 ENCOUNTER — HOSPITAL ENCOUNTER (OUTPATIENT)
Dept: GENERAL RADIOLOGY | Age: 58
End: 2023-07-01
Payer: COMMERCIAL

## 2023-07-01 ENCOUNTER — HOSPITAL ENCOUNTER (OUTPATIENT)
Age: 58
Discharge: HOME | End: 2023-07-01
Payer: COMMERCIAL

## 2023-07-01 DIAGNOSIS — R52 PAIN: ICD-10-CM

## 2023-07-01 LAB
ALBUMIN SERPL-MCNC: 4.2 G/DL (ref 3.5–5.2)
ALP SERPL-CCNC: 54 U/L (ref 40–129)
ALT SERPL-CCNC: 14 U/L (ref 0–40)
ANION GAP SERPL CALCULATED.3IONS-SCNC: 11 MMOL/L (ref 7–16)
AST SERPL-CCNC: 14 U/L (ref 0–39)
BILIRUB SERPL-MCNC: 0.3 MG/DL (ref 0–1.2)
BUN SERPL-MCNC: 16 MG/DL (ref 6–20)
CALCIUM SERPL-MCNC: 9.3 MG/DL (ref 8.6–10.2)
CHLORIDE SERPL-SCNC: 104 MMOL/L (ref 98–107)
CHOLESTEROL, TOTAL: 133 MG/DL (ref 0–199)
CO2 SERPL-SCNC: 24 MMOL/L (ref 22–29)
CREAT SERPL-MCNC: 0.9 MG/DL (ref 0.7–1.2)
ERYTHROCYTE [DISTWIDTH] IN BLOOD BY AUTOMATED COUNT: 12.6 FL (ref 11.5–15)
GLUCOSE SERPL-MCNC: 218 MG/DL (ref 74–99)
HCT VFR BLD AUTO: 43.2 % (ref 37–54)
HDLC SERPL-MCNC: 40 MG/DL
HGB BLD-MCNC: 15.5 G/DL (ref 12.5–16.5)
LDLC SERPL CALC-MCNC: 75 MG/DL (ref 0–99)
MCH RBC QN AUTO: 32.8 PG (ref 26–35)
MCHC RBC AUTO-ENTMCNC: 35.9 % (ref 32–34.5)
MCV RBC AUTO: 91.3 FL (ref 80–99.9)
PLATELET # BLD AUTO: 215 E9/L (ref 130–450)
PMV BLD AUTO: 10.1 FL (ref 7–12)
POTASSIUM SERPL-SCNC: 4.6 MMOL/L (ref 3.5–5)
PROT SERPL-MCNC: 7.7 G/DL (ref 6.4–8.3)
RBC # BLD AUTO: 4.73 E12/L (ref 3.8–5.8)
SODIUM SERPL-SCNC: 139 MMOL/L (ref 132–146)
T4 SERPL-MCNC: 8.1 MCG/DL (ref 4.5–11.7)
TRIGL SERPL-MCNC: 91 MG/DL (ref 0–149)
TSH SERPL-MCNC: 2.44 UIU/ML (ref 0.27–4.2)
VLDLC SERPL CALC-MCNC: 18 MG/DL
WBC # BLD: 8.9 E9/L (ref 4.5–11.5)

## 2023-07-01 PROCEDURE — 80061 LIPID PANEL: CPT

## 2023-07-01 PROCEDURE — 84443 ASSAY THYROID STIM HORMONE: CPT

## 2023-07-01 PROCEDURE — 85027 COMPLETE CBC AUTOMATED: CPT

## 2023-07-01 PROCEDURE — 71046 X-RAY EXAM CHEST 2 VIEWS: CPT

## 2023-07-01 PROCEDURE — 80053 COMPREHEN METABOLIC PANEL: CPT

## 2023-07-01 PROCEDURE — 36415 COLL VENOUS BLD VENIPUNCTURE: CPT

## 2023-07-01 PROCEDURE — 84436 ASSAY OF TOTAL THYROXINE: CPT

## 2024-05-22 ENCOUNTER — HOSPITAL ENCOUNTER (EMERGENCY)
Age: 59
Discharge: HOME | End: 2024-05-22
Attending: EMERGENCY MEDICINE
Payer: COMMERCIAL

## 2024-05-22 ENCOUNTER — APPOINTMENT (OUTPATIENT)
Dept: CT IMAGING | Age: 59
End: 2024-05-22
Payer: COMMERCIAL

## 2024-05-22 VITALS
HEART RATE: 75 BPM | RESPIRATION RATE: 18 BRPM | HEIGHT: 66 IN | OXYGEN SATURATION: 96 % | BODY MASS INDEX: 39.34 KG/M2 | SYSTOLIC BLOOD PRESSURE: 153 MMHG | DIASTOLIC BLOOD PRESSURE: 94 MMHG | WEIGHT: 244.8 LBS | TEMPERATURE: 97.5 F

## 2024-05-22 DIAGNOSIS — K80.20 GALLSTONES: Primary | ICD-10-CM
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
## 2024-05-22 DIAGNOSIS — J21.9 ACUTE BRONCHIOLITIS DUE TO UNSPECIFIED ORGANISM: ICD-10-CM

## 2024-05-22 LAB
ALBUMIN SERPL-MCNC: 4 G/DL (ref 3.5–5.2)
ALP SERPL-CCNC: 52 U/L (ref 40–129)
ALT SERPL-CCNC: 19 U/L (ref 0–40)
ANION GAP SERPL CALCULATED.3IONS-SCNC: 9 MMOL/L (ref 7–16)
AST SERPL-CCNC: 16 U/L (ref 0–39)
BASOPHILS # BLD: 0.05 K/UL (ref 0–0.2)
BASOPHILS NFR BLD: 1 % (ref 0–2)
BILIRUB SERPL-MCNC: 0.5 MG/DL (ref 0–1.2)
BUN SERPL-MCNC: 15 MG/DL (ref 6–20)
CALCIUM SERPL-MCNC: 9 MG/DL (ref 8.6–10.2)
CHLORIDE SERPL-SCNC: 101 MMOL/L (ref 98–107)
CO2 SERPL-SCNC: 26 MMOL/L (ref 22–29)
CREAT SERPL-MCNC: 0.8 MG/DL (ref 0.7–1.2)
EOSINOPHIL # BLD: 0.19 K/UL (ref 0.05–0.5)
EOSINOPHILS RELATIVE PERCENT: 2 % (ref 0–6)
ERYTHROCYTE [DISTWIDTH] IN BLOOD BY AUTOMATED COUNT: 12.2 % (ref 11.5–15)
GFR, ESTIMATED: >90 ML/MIN/1.73M2
GLUCOSE SERPL-MCNC: 252 MG/DL (ref 74–99)
HCT VFR BLD AUTO: 40.5 % (ref 37–54)
HGB BLD-MCNC: 14.5 G/DL (ref 12.5–16.5)
IMM GRANULOCYTES # BLD AUTO: 0.04 K/UL (ref 0–0.58)
IMM GRANULOCYTES NFR BLD: 0 % (ref 0–5)
LIPASE SERPL-CCNC: 14 U/L (ref 13–60)
LYMPHOCYTES NFR BLD: 1.08 K/UL (ref 1.5–4)
LYMPHOCYTES RELATIVE PERCENT: 11 % (ref 20–42)
MCH RBC QN AUTO: 32.8 PG (ref 26–35)
MCHC RBC AUTO-ENTMCNC: 35.8 G/DL (ref 32–34.5)
MCV RBC AUTO: 91.6 FL (ref 80–99.9)
MONOCYTES NFR BLD: 0.87 K/UL (ref 0.1–0.95)
MONOCYTES NFR BLD: 9 % (ref 2–12)
NEUTROPHILS NFR BLD: 78 % (ref 43–80)
NEUTS SEG NFR BLD: 8.05 K/UL (ref 1.8–7.3)
PLATELET # BLD AUTO: 236 K/UL (ref 130–450)
PMV BLD AUTO: 10.3 FL (ref 7–12)
POTASSIUM SERPL-SCNC: 4.6 MMOL/L (ref 3.5–5)
PROT SERPL-MCNC: 7.4 G/DL (ref 6.4–8.3)
RBC # BLD AUTO: 4.42 M/UL (ref 3.8–5.8)
SODIUM SERPL-SCNC: 136 MMOL/L (ref 132–146)
TROPONIN I SERPL HS-MCNC: 9 NG/L (ref 0–11)
WBC OTHER # BLD: 10.3 K/UL (ref 4.5–11.5)

## 2024-05-22 PROCEDURE — 72128 CT CHEST SPINE W/O DYE: CPT

## 2024-05-22 PROCEDURE — 99284 EMERGENCY DEPT VISIT MOD MDM: CPT

## 2024-05-22 PROCEDURE — 85025 COMPLETE CBC W/AUTO DIFF WBC: CPT

## 2024-05-22 PROCEDURE — 93005 ELECTROCARDIOGRAM TRACING: CPT | Performed by: EMERGENCY MEDICINE

## 2024-05-22 PROCEDURE — 72125 CT NECK SPINE W/O DYE: CPT

## 2024-05-22 PROCEDURE — 6360000002 HC RX W HCPCS: Performed by: EMERGENCY MEDICINE

## 2024-05-22 PROCEDURE — 71250 CT THORAX DX C-: CPT

## 2024-05-22 PROCEDURE — 96374 THER/PROPH/DIAG INJ IV PUSH: CPT

## 2024-05-22 PROCEDURE — 83690 ASSAY OF LIPASE: CPT

## 2024-05-22 PROCEDURE — 84484 ASSAY OF TROPONIN QUANT: CPT

## 2024-05-22 PROCEDURE — 80053 COMPREHEN METABOLIC PANEL: CPT

## 2024-05-22 PROCEDURE — 74176 CT ABD & PELVIS W/O CONTRAST: CPT

## 2024-05-22 PROCEDURE — 70450 CT HEAD/BRAIN W/O DYE: CPT

## 2024-05-22 RX ORDER — POLYETHYLENE GLYCOL 3350 17 G/17G
17 POWDER, FOR SOLUTION ORAL DAILY
Qty: 510 G | Refills: 0 | Status: SHIPPED | OUTPATIENT
Start: 2024-05-22 | End: 2024-06-21

## 2024-05-22 RX ORDER — LIDOCAINE 50 MG/G
1 PATCH TOPICAL DAILY
Qty: 10 PATCH | Refills: 0 | Status: SHIPPED | OUTPATIENT
Start: 2024-05-22 | End: 2024-06-01

## 2024-05-22 RX ORDER — FENTANYL CITRATE 50 UG/ML
50 INJECTION, SOLUTION INTRAMUSCULAR; INTRAVENOUS ONCE
Status: COMPLETED | OUTPATIENT
Start: 2024-05-22 | End: 2024-05-22

## 2024-05-22 RX ORDER — PREDNISONE 50 MG/1
50 TABLET ORAL DAILY
Qty: 5 TABLET | Refills: 0 | Status: SHIPPED | OUTPATIENT
Start: 2024-05-22 | End: 2024-05-27

## 2024-05-22 RX ORDER — DOXYCYCLINE HYCLATE 100 MG
100 TABLET ORAL 2 TIMES DAILY
Qty: 14 TABLET | Refills: 0 | Status: SHIPPED | OUTPATIENT
Start: 2024-05-22 | End: 2024-05-29

## 2024-05-22 RX ADMIN — FENTANYL CITRATE 50 MCG: 50 INJECTION INTRAMUSCULAR; INTRAVENOUS at 09:50

## 2024-05-22 ASSESSMENT — ENCOUNTER SYMPTOMS
ABDOMINAL PAIN: 0
SHORTNESS OF BREATH: 0
COUGH: 0
BACK PAIN: 1

## 2024-05-22 ASSESSMENT — PAIN SCALES - GENERAL: PAINLEVEL_OUTOF10: 10

## 2024-05-22 ASSESSMENT — PAIN DESCRIPTION - LOCATION: LOCATION: BACK;CHEST

## 2024-05-22 ASSESSMENT — PAIN DESCRIPTION - DESCRIPTORS: DESCRIPTORS: BURNING;STABBING

## 2024-05-22 ASSESSMENT — PAIN DESCRIPTION - ORIENTATION: ORIENTATION: LEFT

## 2024-05-22 NOTE — ED PROVIDER NOTES
This is a 58 year old with a PMH of CVA and Tobacco Use disorder who presented to the ED for evaluation of a fall. Patient states that 3 days ago he was walking and slipped and fell on three steps. He states that he landed backwards and landed on his left side. He states that he did hit his head but he never did lose consciousness. Patient states that he has been having pain in his left upper back and chest since the incident. He states taht he does not believe that he is on blood thinners. Patient has no hip pain or abdominal pain or low back pain. He has no other reported mitigating or worsening factors.    The history is provided by the patient.        Review of Systems   Constitutional:  Negative for fever.   HENT:  Negative for congestion.    Eyes:  Negative for visual disturbance.   Respiratory:  Negative for cough and shortness of breath.    Cardiovascular:  Positive for chest pain.   Gastrointestinal:  Negative for abdominal pain.   Endocrine: Negative for polyuria.   Genitourinary:  Negative for dysuria.   Musculoskeletal:  Positive for back pain.   Skin:  Negative for rash.   Allergic/Immunologic: Negative for immunocompromised state.   Neurological:  Negative for headaches.   Hematological:  Does not bruise/bleed easily.   Psychiatric/Behavioral:  Negative for confusion.         Physical Exam  Vitals and nursing note reviewed.   Constitutional:       General: He is not in acute distress.     Appearance: He is well-developed.   HENT:      Head: Normocephalic and atraumatic.      Mouth/Throat:      Mouth: Mucous membranes are moist.   Eyes:      Extraocular Movements: Extraocular movements intact.      Pupils: Pupils are equal, round, and reactive to light.   Neck:      Vascular: No JVD.   Cardiovascular:      Rate and Rhythm: Normal rate and regular rhythm.   Pulmonary:      Effort: Pulmonary effort is normal.   Abdominal:      General: There is no distension.      Palpations: Abdomen is soft.

## 2024-05-24 LAB
EKG ATRIAL RATE: 74 BPM
EKG P AXIS: 41 DEGREES
EKG P-R INTERVAL: 192 MS
EKG Q-T INTERVAL: 386 MS
EKG QRS DURATION: 88 MS
EKG QTC CALCULATION (BAZETT): 428 MS
EKG R AXIS: 41 DEGREES
EKG T AXIS: 34 DEGREES
EKG VENTRICULAR RATE: 74 BPM